# Patient Record
Sex: FEMALE | Race: BLACK OR AFRICAN AMERICAN | NOT HISPANIC OR LATINO | Employment: FULL TIME | URBAN - METROPOLITAN AREA
[De-identification: names, ages, dates, MRNs, and addresses within clinical notes are randomized per-mention and may not be internally consistent; named-entity substitution may affect disease eponyms.]

---

## 2017-10-23 ENCOUNTER — TELEPHONE (OUTPATIENT)
Dept: OBSTETRICS AND GYNECOLOGY | Facility: CLINIC | Age: 27
End: 2017-10-23

## 2017-10-23 NOTE — TELEPHONE ENCOUNTER
DR BRADY PATIENT    HAS NOB APPOINTMENT ON 11/6/17 WITH DR BRADY  HAS QUESTION ABOUT MEDICATIONS    PLEASE CALL HER    WOULD LIKE TO BE CALLED EARLY Tuesday MORNING  [phone]

## 2017-10-24 ENCOUNTER — TELEPHONE (OUTPATIENT)
Dept: OBSTETRICS AND GYNECOLOGY | Facility: CLINIC | Age: 27
End: 2017-10-24

## 2017-10-24 NOTE — TELEPHONE ENCOUNTER
Pt of Dr HAMILTON is needing to have metamucil prescribed to her.  Pharmacy [facility] [phone]

## 2017-10-30 DIAGNOSIS — Z34.91 FIRST TRIMESTER PREGNANCY: Primary | ICD-10-CM

## 2017-11-05 PROBLEM — Z01.419 WELL WOMAN EXAM: Status: ACTIVE | Noted: 2017-11-05

## 2017-11-06 ENCOUNTER — INITIAL PRENATAL (OUTPATIENT)
Dept: OBSTETRICS AND GYNECOLOGY | Facility: CLINIC | Age: 27
End: 2017-11-06

## 2017-11-06 ENCOUNTER — LAB REQUISITION (OUTPATIENT)
Dept: LAB | Facility: HOSPITAL | Age: 27
End: 2017-11-06

## 2017-11-06 VITALS — WEIGHT: 232 LBS | SYSTOLIC BLOOD PRESSURE: 116 MMHG | DIASTOLIC BLOOD PRESSURE: 72 MMHG | BODY MASS INDEX: 39.82 KG/M2

## 2017-11-06 DIAGNOSIS — Z00.00 ROUTINE GENERAL MEDICAL EXAMINATION AT A HEALTH CARE FACILITY: ICD-10-CM

## 2017-11-06 DIAGNOSIS — Z34.01 ENCOUNTER FOR SUPERVISION OF NORMAL FIRST PREGNANCY IN FIRST TRIMESTER: Primary | ICD-10-CM

## 2017-11-06 PROBLEM — K59.00 CONSTIPATION DURING PREGNANCY: Status: ACTIVE | Noted: 2017-11-06

## 2017-11-06 PROBLEM — O99.619 CONSTIPATION DURING PREGNANCY: Status: ACTIVE | Noted: 2017-11-06

## 2017-11-06 PROBLEM — Z34.00 PREGNANCY, SUPERVISION, NORMAL, FIRST: Status: ACTIVE | Noted: 2017-11-06

## 2017-11-06 PROCEDURE — 36415 COLL VENOUS BLD VENIPUNCTURE: CPT

## 2017-11-06 PROCEDURE — 99203 OFFICE O/P NEW LOW 30 MIN: CPT | Performed by: OBSTETRICS & GYNECOLOGY

## 2017-11-06 RX ORDER — RANITIDINE 150 MG/1
150 TABLET ORAL NIGHTLY
COMMUNITY
Start: 2017-09-22 | End: 2020-01-09

## 2017-11-06 NOTE — PROGRESS NOTES
Subjective   Chief Complaint   Patient presents with   • Initial Prenatal Visit       Jud Cerrato is a 26 y.o. year old .  Patient's last menstrual period was 2017 (approximate).  She presents to be seen to initiate prenatal care.     Smoking status: Never Smoker                                                              Smokeless status: Never Used                          The following portions of the patient's history were reviewed and updated as appropriate:vital signs, allergies, current medications, past medical history, past social history, past surgical history and problem list.    Objective   Lab Review   No data reviewed    Imaging   Pelvic ultrasound report    Assessment/Plan   ASSESSMENT  1. 26 y.o. year old  at 6w5d  2. Supervision of low risk pregnancy    PLAN  1. The problem list for pregnancy was initiated today  2. Tests ordered today:  Orders Placed This Encounter   Procedures   • Urine Culture - Urine, Urine, Clean Catch   • HIV-1 / O / 2 Ag / Antibody 4th Generation   • Obstetric Panel   • Urine Drug Screen - Urine, Clean Catch     Please confirm all positive UDS   • Hgb. Frac. Profile     3. Testing for GC / Chlamydia / trichomonas will need to be done at her next prenatal visit  4. Genetic testing reviewed: MSAFP-4 and they will consider the information and make a decision at a later date.  5. Information reviewed: exercise in pregnancy, nutrition in pregnancy, weight gain in pregnancy, work and travel restrictions during pregnancy, list of OTC medications acceptable in pregnancy and call coverage groups    Total time spent today with Jud  was 35 minutes (level 3).  Off this time, 100% was spent face-to-face time coordinating care, answering her questions and counseling regarding pathophysiology of her presenting problem along with plans for any diagnositc work-up and treatment.    Follow up: 6 week(s)       This note was electronically signed.    Jovon Guerra,  MD  November 6, 2017

## 2017-11-07 LAB
HGB A MFR BLD: 97.6 % (ref 94–98)
HGB A2 MFR BLD COLUMN CHROM: 2.4 % (ref 0.7–3.1)
HGB C MFR BLD: 0 %
HGB F MFR BLD: 0 % (ref 0–2)
HGB FRACT BLD-IMP: NORMAL
HGB S BLD QL SOLY: NEGATIVE
HGB S MFR BLD: 0 %

## 2017-11-08 PROBLEM — O99.011 ANEMIA COMPLICATING PREGNANCY IN FIRST TRIMESTER: Status: ACTIVE | Noted: 2017-11-08

## 2017-11-08 LAB
ABO GROUP BLD: (no result)
AMPHETAMINES UR QL SCN: NEGATIVE NG/ML
BACTERIA UR CULT: NORMAL
BACTERIA UR CULT: NORMAL
BARBITURATES UR QL SCN: NEGATIVE NG/ML
BASOPHILS # BLD AUTO: 0 X10E3/UL (ref 0–0.2)
BASOPHILS NFR BLD AUTO: 0 %
BENZODIAZ UR QL SCN: NEGATIVE NG/ML
BLD GP AB SCN SERPL QL: NEGATIVE
BZE UR QL SCN: NEGATIVE NG/ML
CANNABINOIDS UR QL SCN: NEGATIVE NG/ML
CREAT UR-MCNC: 234.4 MG/DL (ref 20–300)
EOSINOPHIL # BLD AUTO: 0.1 X10E3/UL (ref 0–0.4)
EOSINOPHIL NFR BLD AUTO: 0 %
ERYTHROCYTE [DISTWIDTH] IN BLOOD BY AUTOMATED COUNT: 14.6 % (ref 12.3–15.4)
FENTANYL+NORFENTANYL UR QL SCN: NEGATIVE PG/ML
HBV SURFACE AG SERPL QL IA: NEGATIVE
HCT VFR BLD AUTO: 34.5 % (ref 34–46.6)
HGB BLD-MCNC: 11.5 G/DL (ref 11.1–15.9)
HIV 1+2 AB+HIV1 P24 AG SERPL QL IA: NON REACTIVE
IMM GRANULOCYTES # BLD: 0 X10E3/UL (ref 0–0.1)
IMM GRANULOCYTES NFR BLD: 0 %
LYMPHOCYTES # BLD AUTO: 1.9 X10E3/UL (ref 0.7–3.1)
LYMPHOCYTES NFR BLD AUTO: 17 %
Lab: NORMAL
MCH RBC QN AUTO: 28.7 PG (ref 26.6–33)
MCHC RBC AUTO-ENTMCNC: 33.3 G/DL (ref 31.5–35.7)
MCV RBC AUTO: 86 FL (ref 79–97)
MEPERIDINE UR QL: NEGATIVE NG/ML
METHADONE UR QL SCN: NEGATIVE NG/ML
MONOCYTES # BLD AUTO: 1 X10E3/UL (ref 0.1–0.9)
MONOCYTES NFR BLD AUTO: 9 %
NEUTROPHILS # BLD AUTO: 8.4 X10E3/UL (ref 1.4–7)
NEUTROPHILS NFR BLD AUTO: 74 %
OPIATES UR QL SCN: NEGATIVE NG/ML
OXYCODONE+OXYMORPHONE UR QL SCN: NEGATIVE NG/ML
PCP UR QL: NEGATIVE NG/ML
PH UR: 5.9 [PH] (ref 4.5–8.9)
PLATELET # BLD AUTO: 273 X10E3/UL (ref 150–379)
PROPOXYPH UR QL SCN: NEGATIVE NG/ML
RBC # BLD AUTO: 4.01 X10E6/UL (ref 3.77–5.28)
RH BLD: POSITIVE
RPR SER QL: NON REACTIVE
RUBV IGG SERPL IA-ACNC: 1.37 INDEX
SP GR UR: 1.03
TRAMADOL UR QL SCN: NEGATIVE NG/ML
WBC # BLD AUTO: 11.4 X10E3/UL (ref 3.4–10.8)

## 2017-11-08 RX ORDER — FERROUS SULFATE 325(65) MG
325 TABLET ORAL
Qty: 60 TABLET | Refills: 10 | Status: ON HOLD | OUTPATIENT
Start: 2017-11-08 | End: 2018-06-30

## 2017-11-10 ENCOUNTER — TELEPHONE (OUTPATIENT)
Dept: OBSTETRICS AND GYNECOLOGY | Facility: CLINIC | Age: 27
End: 2017-11-10

## 2017-11-10 RX ORDER — ASCORBIC ACID, CALCIUM CITRATE, IRON, VITAMIN D, DL- ALPHA- TOCOPHEROL ACETATE, THIAMINE, RIBOFLAVIN, NIACINAMIDE, PYRIDOXINE HYDROCHLORIDE, FOLIC ACID, IODINE, ZINC, COPPER, DOCUSATE SODIUM, DOCONEXENT AND ICOSAPENT
1 KIT DAILY
Qty: 30 TABLET | Refills: 11 | Status: SHIPPED | OUTPATIENT
Start: 2017-11-10 | End: 2017-11-10 | Stop reason: SDUPTHER

## 2017-11-10 RX ORDER — ASCORBIC ACID, CALCIUM CITRATE, IRON, VITAMIN D, DL- ALPHA- TOCOPHEROL ACETATE, THIAMINE, RIBOFLAVIN, NIACINAMIDE, PYRIDOXINE HYDROCHLORIDE, FOLIC ACID, IODINE, ZINC, COPPER, DOCUSATE SODIUM, DOCONEXENT AND ICOSAPENT
1 KIT DAILY
Qty: 30 TABLET | Refills: 11 | Status: SHIPPED | OUTPATIENT
Start: 2017-11-10 | End: 2017-11-16 | Stop reason: ALTCHOICE

## 2017-11-10 NOTE — TELEPHONE ENCOUNTER
Jud called back today for lab results.  Per Dr. Guerra, she is anemic and needs to be taking iron.  His note indicates that he has already sent that prescription to her pharmacy, and she is to take one tablet daily X 7 days, and if constipation isn't an issue, may increase to 2 tablets daily for the remainder of her pregnancy.  She states that she does have some issues with constipation, and due to that would like to have a prescription for Citra Madai Assure, which she says contains a stool softener.  She was given a sample of this vitamin and has tolerated it well.  I will send in this prescription, and she will take this (if her insurance will cover, will call back if not) and her iron daily.

## 2017-11-16 ENCOUNTER — TELEPHONE (OUTPATIENT)
Dept: OBSTETRICS AND GYNECOLOGY | Facility: CLINIC | Age: 27
End: 2017-11-16

## 2017-11-16 RX ORDER — VITAMIN- MINERAL OMEGA-3 SUPPLEMENT 53.5; 38; 25; 1; 2; 3; 1.8; 5; 25; 300; 12.5; 2; 5; 10; 2 MG/1; MG/1; MG/1; MG/1; MG/1; MG/1; MG/1; MG/1; MG/1; UG/1; UG/1; MG/1; MG/1; MG/1; MG/1
1 CAPSULE, LIQUID FILLED ORAL DAILY
Qty: 30 CAPSULE | Refills: 9 | Status: SHIPPED | OUTPATIENT
Start: 2017-11-16 | End: 2017-12-14

## 2017-11-16 NOTE — TELEPHONE ENCOUNTER
Dr. Guerra patient 8w1d  Insurance will not cover CitraNatal requesting something else that insurance will cover. I will send in a prescription for Concept DHA #30 with 9 refills to her pharmacy [facility]. Patient notified

## 2017-11-16 NOTE — TELEPHONE ENCOUNTER
Pt called stating a RX for PNV was sent to pharmacy but they dont have in stock-states per the pharmacist we need to send a generic RX for PNV they can use that to fill with what they have in stock

## 2017-11-20 ENCOUNTER — TELEPHONE (OUTPATIENT)
Dept: OBSTETRICS AND GYNECOLOGY | Facility: CLINIC | Age: 27
End: 2017-11-20

## 2017-11-20 RX ORDER — VITAMIN- MINERAL OMEGA-3 SUPPLEMENT 53.5; 38; 25; 1; 2; 3; 1.8; 5; 25; 300; 12.5; 2; 5; 10; 2 MG/1; MG/1; MG/1; MG/1; MG/1; MG/1; MG/1; MG/1; MG/1; UG/1; UG/1; MG/1; MG/1; MG/1; MG/1
1 CAPSULE, LIQUID FILLED ORAL DAILY
Qty: 30 CAPSULE | Refills: 12 | Status: SHIPPED | OUTPATIENT
Start: 2017-11-20 | End: 2017-12-14

## 2017-11-27 ENCOUNTER — TELEPHONE (OUTPATIENT)
Dept: OBSTETRICS AND GYNECOLOGY | Facility: CLINIC | Age: 27
End: 2017-11-27

## 2017-11-27 RX ORDER — PNV NO.95/FERROUS FUM/FOLIC AC 28MG-0.8MG
1 TABLET ORAL DAILY
Qty: 30 TABLET | Refills: 12 | Status: SHIPPED | OUTPATIENT
Start: 2017-11-27 | End: 2018-08-13

## 2017-11-27 NOTE — TELEPHONE ENCOUNTER
PT'S PHARMACY SENT OVER A REQUEST FORM FOR A REFILL ON HER PRENATAL VITAMINS. HER INSURANCE WILL NOT APPROVE THE PRENATALS WITH DHA.  PATIENT ONLY HAS ONE PILL LEFT OF HER SAMPLES THAT WERE GIVEN TO HER. SHE NEEDS REFILLS PLEASE.    [facility] ON [address]

## 2017-11-27 NOTE — TELEPHONE ENCOUNTER
Dr Guerra Pt is calling to see if the nurse ever got the fax in order for her to get her prenatal vitamins

## 2017-12-14 ENCOUNTER — ROUTINE PRENATAL (OUTPATIENT)
Dept: OBSTETRICS AND GYNECOLOGY | Facility: CLINIC | Age: 27
End: 2017-12-14

## 2017-12-14 VITALS — SYSTOLIC BLOOD PRESSURE: 112 MMHG | WEIGHT: 258 LBS | BODY MASS INDEX: 44.29 KG/M2 | DIASTOLIC BLOOD PRESSURE: 68 MMHG

## 2017-12-14 DIAGNOSIS — Z34.01 ENCOUNTER FOR SUPERVISION OF NORMAL FIRST PREGNANCY IN FIRST TRIMESTER: Primary | ICD-10-CM

## 2017-12-14 DIAGNOSIS — O99.011 ANEMIA COMPLICATING PREGNANCY IN FIRST TRIMESTER: ICD-10-CM

## 2017-12-14 LAB — 2ND TRIMESTER 4 SCREEN SERPL-IMP: NORMAL

## 2017-12-14 PROCEDURE — 99213 OFFICE O/P EST LOW 20 MIN: CPT | Performed by: OBSTETRICS & GYNECOLOGY

## 2017-12-14 NOTE — PROGRESS NOTES
Chief Complaint   Patient presents with   • Routine Prenatal Visit       HPI: Jud is a  currently at 12w1d who today reports the following:  Nausea - No; Vaginal bleeding -  No; Heartburn - No.    ROS:  GI: Constipation - No; Diarrhea - No    Neuro: Headache - No; Visual change - No      EXAM:  Vitals: See prenatal flowsheet   Abdomen: See prenatal flowsheet   Urine glucose/protein: See prenatal flowsheet   Pelvic: See prenatal flowsheet     Prenatal Labs  Lab Results   Component Value Date    HGB 11.5 2017    RUBELLAABIGG 1.37 2017    HEPBSAG Negative 2017    ABSCRN Negative 2017    AET5QUU6 Non Reactive 2017    URINECX Final report 2017       MDM:  Impression: 1. Supervision of high risk pregnancy  2. Anemia in pregnancy  3. Constipation in pregnancy   Tests done today: 1. PAP  2. GC/Chlamydia testing   Topics discussed: 1. Continue with PNV's  2. Prenatal labs reviewed  3. Today we discussed genetic testing.  They are aware that the MSAFP-4 is a screening test.  A screening test is not a diagnostic test.  This means that a negative test does not guarantee an unaffected fetus and a positive test does not mean the fetus has the condition for which the test is being performed.  If the test returns positive, a diagnostic test should be consider to determine if the fetus in fact has the condition.  After considering the options previously presented, she are not interested in having genetic testing performed.   Tests scheduled today for her next visit:   none   Next visit: See prenatal flowsheet

## 2017-12-20 ENCOUNTER — TELEPHONE (OUTPATIENT)
Dept: OBSTETRICS AND GYNECOLOGY | Facility: CLINIC | Age: 27
End: 2017-12-20

## 2018-01-02 ENCOUNTER — TELEPHONE (OUTPATIENT)
Dept: OBSTETRICS AND GYNECOLOGY | Facility: CLINIC | Age: 28
End: 2018-01-02

## 2018-01-02 NOTE — TELEPHONE ENCOUNTER
Dr Guerra Pt    Pt is about 15 weeks pregnant and is experiencing headaches and pelvic pain.    Pt was scheduled to see Dr. Tello tomorrow.

## 2018-01-03 ENCOUNTER — ROUTINE PRENATAL (OUTPATIENT)
Dept: OBSTETRICS AND GYNECOLOGY | Facility: CLINIC | Age: 28
End: 2018-01-03

## 2018-01-03 VITALS — WEIGHT: 260 LBS | SYSTOLIC BLOOD PRESSURE: 116 MMHG | DIASTOLIC BLOOD PRESSURE: 72 MMHG | BODY MASS INDEX: 44.63 KG/M2

## 2018-01-03 DIAGNOSIS — O99.011 ANEMIA COMPLICATING PREGNANCY IN FIRST TRIMESTER: ICD-10-CM

## 2018-01-03 DIAGNOSIS — Z34.02 ENCOUNTER FOR SUPERVISION OF NORMAL FIRST PREGNANCY IN SECOND TRIMESTER: Primary | ICD-10-CM

## 2018-01-03 PROCEDURE — 99213 OFFICE O/P EST LOW 20 MIN: CPT | Performed by: OBSTETRICS & GYNECOLOGY

## 2018-01-03 NOTE — PROGRESS NOTES
Chief Complaint   Patient presents with   • Routine Prenatal Visit       HPI: Jud is a  currently at 15w0d who today reports the following:  Contractions - No; Leaking - No; Vaginal bleeding -  No; Heartburn - No.    ROS:  GI: Nausea - No ; Constipation - Still a problem.  Using Colace once daily.; Diarrhea - No    Neuro: Headache - No ; Visual change - No      EXAM:  Vitals: See prenatal flowsheet   Abdomen: See prenatal flowsheet   Urine glucose/protein: See prenatal flowsheet   Pelvic: See prenatal flowsheet     Lab Results   Component Value Date    ABO A 2017    RH Positive 2017    ABSCRN Negative 2017       MDM:  Impression: 1. Supervision of high risk pregnancy  2. Anemia in pregnancy  3. Constipation in pregnancy   Tests done today: 1. none   Topics discussed: 1. Continue with PNV's  2. Prenatal labs reviewed  3. Use of over-the-counter MiraLAX as needed  and increase Colace to twice daily   Tests scheduled today for her next visit:   U/S for anatomic screening at [facility]   Next visit: See prenatal flowsheet

## 2018-02-08 ENCOUNTER — HOSPITAL ENCOUNTER (OUTPATIENT)
Dept: WOMENS IMAGING | Facility: HOSPITAL | Age: 28
Discharge: HOME | End: 2018-02-08
Attending: OBSTETRICS & GYNECOLOGY | Admitting: OBSTETRICS & GYNECOLOGY

## 2018-02-08 ENCOUNTER — OFFICE VISIT (OUTPATIENT)
Dept: OBSTETRICS AND GYNECOLOGY | Facility: HOSPITAL | Age: 28
End: 2018-02-08

## 2018-02-08 ENCOUNTER — ROUTINE PRENATAL (OUTPATIENT)
Dept: OBSTETRICS AND GYNECOLOGY | Facility: CLINIC | Age: 28
End: 2018-02-08

## 2018-02-08 VITALS — DIASTOLIC BLOOD PRESSURE: 64 MMHG | WEIGHT: 270 LBS | SYSTOLIC BLOOD PRESSURE: 107 MMHG | BODY MASS INDEX: 46.35 KG/M2

## 2018-02-08 DIAGNOSIS — Z34.02 ENCOUNTER FOR SUPERVISION OF NORMAL FIRST PREGNANCY IN SECOND TRIMESTER: ICD-10-CM

## 2018-02-08 DIAGNOSIS — O99.011 ANEMIA COMPLICATING PREGNANCY IN FIRST TRIMESTER: ICD-10-CM

## 2018-02-08 DIAGNOSIS — Z34.02 ENCOUNTER FOR SUPERVISION OF NORMAL FIRST PREGNANCY IN SECOND TRIMESTER: Primary | ICD-10-CM

## 2018-02-08 DIAGNOSIS — E66.01 MORBID OBESITY WITH BMI OF 45.0-49.9, ADULT (HCC): ICD-10-CM

## 2018-02-08 DIAGNOSIS — IMO0002 FETAL ANOMALY: Primary | ICD-10-CM

## 2018-02-08 DIAGNOSIS — O35.9XX0 TERATOGEN EXPOSURE IN CURRENT PREGNANCY, SINGLE OR UNSPECIFIED FETUS: ICD-10-CM

## 2018-02-08 PROCEDURE — 76811 OB US DETAILED SNGL FETUS: CPT | Performed by: OBSTETRICS & GYNECOLOGY

## 2018-02-08 PROCEDURE — 76811 OB US DETAILED SNGL FETUS: CPT

## 2018-02-08 PROCEDURE — 99213 OFFICE O/P EST LOW 20 MIN: CPT | Performed by: OBSTETRICS & GYNECOLOGY

## 2018-02-08 NOTE — PROGRESS NOTES
Chief Complaint   Patient presents with   • Routine Prenatal Visit       HPI: Jud is a  currently at 20w1d who today reports the following:  Contractions - No; Leaking - No; Vaginal bleeding -  No; Heartburn - No.    ROS:  GI: Nausea - No ; Constipation - No; Diarrhea - No    Neuro: Headache - No ; Visual change - No      EXAM:  Vitals: See prenatal flowsheet   Abdomen: See prenatal flowsheet   Urine glucose/protein: See prenatal flowsheet   Pelvic: See prenatal flowsheet     Lab Results   Component Value Date    ABO A 2017    RH Positive 2017    ABSCRN Negative 2017       MDM:  Impression: 1. Supervision of high risk pregnancy  2. Anemia in pregnancy   Tests done today: 1. U/S for anatomic screening - anatomy was not completely seen today   Topics discussed: 1. Continue with PNV's  2. Prenatal labs reviewed  3. flu vaccine   Tests scheduled today for her next visit:   U/S to complete the anatomic screening at [facility]   Next visit: See prenatal flowsheet

## 2018-02-19 ENCOUNTER — TELEPHONE (OUTPATIENT)
Dept: OBSTETRICS AND GYNECOLOGY | Facility: CLINIC | Age: 28
End: 2018-02-19

## 2018-02-19 NOTE — TELEPHONE ENCOUNTER
Provider Name Mari  Reason for Call Right Sided Pain Pelvic area - since last night  Pharmacy Name [facility]  Call Back Number [phone]

## 2018-02-20 NOTE — TELEPHONE ENCOUNTER
Pt states the pain on her right side started yesterday and the day before, Pt states the pain radiates from her side to her back. When ask Pt has she notice any leaking fluids she states no, when ask if the pain comes and goes Pt states the pain was consistent, Pt also notice she was having headaches as well, but as of today Pt is having no pain and no headaches.

## 2018-02-20 NOTE — TELEPHONE ENCOUNTER
I would let her know that the most common reasons for pain in that location at this gestational age are ligaments pulling and stretching.  We cannot exclude other causes unrelated to her pregnancy such as kidney stones or appendicitis.  If she is doing much better today and having no problems at think it can be just watched.  If she starts to have return of symptoms she probably needs to be seen and examined so we can find out exactly what's going on.

## 2018-03-08 ENCOUNTER — ROUTINE PRENATAL (OUTPATIENT)
Dept: OBSTETRICS AND GYNECOLOGY | Facility: CLINIC | Age: 28
End: 2018-03-08

## 2018-03-08 ENCOUNTER — OFFICE VISIT (OUTPATIENT)
Dept: OBSTETRICS AND GYNECOLOGY | Facility: HOSPITAL | Age: 28
End: 2018-03-08

## 2018-03-08 ENCOUNTER — HOSPITAL ENCOUNTER (OUTPATIENT)
Dept: WOMENS IMAGING | Facility: HOSPITAL | Age: 28
Discharge: HOME | End: 2018-03-08
Attending: OBSTETRICS & GYNECOLOGY | Admitting: OBSTETRICS & GYNECOLOGY

## 2018-03-08 VITALS — SYSTOLIC BLOOD PRESSURE: 109 MMHG | BODY MASS INDEX: 47.2 KG/M2 | WEIGHT: 275 LBS | DIASTOLIC BLOOD PRESSURE: 70 MMHG

## 2018-03-08 DIAGNOSIS — Z34.02 ENCOUNTER FOR SUPERVISION OF NORMAL FIRST PREGNANCY IN SECOND TRIMESTER: Primary | ICD-10-CM

## 2018-03-08 DIAGNOSIS — IMO0002 FETAL ANOMALY: ICD-10-CM

## 2018-03-08 DIAGNOSIS — O35.9XX0 TERATOGEN EXPOSURE IN CURRENT PREGNANCY, SINGLE OR UNSPECIFIED FETUS: ICD-10-CM

## 2018-03-08 DIAGNOSIS — E66.01 MORBID OBESITY WITH BMI OF 45.0-49.9, ADULT (HCC): ICD-10-CM

## 2018-03-08 DIAGNOSIS — O99.011 ANEMIA COMPLICATING PREGNANCY IN FIRST TRIMESTER: ICD-10-CM

## 2018-03-08 DIAGNOSIS — E66.01 MORBID OBESITY WITH BMI OF 45.0-49.9, ADULT (HCC): Primary | ICD-10-CM

## 2018-03-08 PROBLEM — O99.210 OBESITY AFFECTING PREGNANCY: Status: ACTIVE | Noted: 2018-02-08

## 2018-03-08 PROCEDURE — 76816 OB US FOLLOW-UP PER FETUS: CPT

## 2018-03-08 PROCEDURE — 76816 OB US FOLLOW-UP PER FETUS: CPT | Performed by: OBSTETRICS & GYNECOLOGY

## 2018-03-08 PROCEDURE — 99213 OFFICE O/P EST LOW 20 MIN: CPT | Performed by: OBSTETRICS & GYNECOLOGY

## 2018-03-08 NOTE — PROGRESS NOTES
"Pt denies lof, vag bleeding or cramping. States she noticed pelvic pressure this morning when she was having a BM which resolved shortly thereafter. States she wants to talk to Dr Guerra today about other options for helping her achieve regular BMs. States she has BM every 1-3 days, is taking her prescribed meds, eating fiber-rich foods and drinking \"lots of water\".  "

## 2018-03-08 NOTE — PROGRESS NOTES
Documentation of the ultasound findings, images, and interpretations with be available in the patient's Viewpoint report located in the Chart Review Imaging tab in [employer].

## 2018-03-08 NOTE — PROGRESS NOTES
Chief Complaint   Patient presents with   • Routine Prenatal Visit       HPI: Jud is a  currently at 24w1d who today reports the following:  Contractions - No; Leaking - No; Vaginal bleeding -  No; Heartburn - No.    ROS:  GI: Nausea - No ; Constipation - YES; Diarrhea - No    Neuro: Headache - No ; Visual change - No      EXAM:  Vitals: See prenatal flowsheet   Abdomen: See prenatal flowsheet   Urine glucose/protein: See prenatal flowsheet   Pelvic: See prenatal flowsheet     Lab Results   Component Value Date    ABO A 2017    RH Positive 2017    ABSCRN Negative 2017       MDM:  Impression: 1. Supervision of high risk pregnancy  2. Anemia in pregnancy  3. Constipation in pregnancy   Tests done today: 1. U/S to complete the anatomic screening - U/S report is not available for review at this time   Topics discussed: 1. Continue with PNV's  2. Prenatal labs reviewed  3. childbirth classes  4. flu vaccine   Tests scheduled today for her next visit:   GCT  HgB   Next visit: See prenatal flowsheet

## 2018-03-13 ENCOUNTER — TELEPHONE (OUTPATIENT)
Dept: OBSTETRICS AND GYNECOLOGY | Facility: CLINIC | Age: 28
End: 2018-03-13

## 2018-03-13 RX ORDER — POLYETHYLENE GLYCOL 3350 17 G/17G
17 POWDER, FOR SOLUTION ORAL DAILY
Qty: 30 EACH | Refills: 12 | Status: SHIPPED | OUTPATIENT
Start: 2018-03-13 | End: 2019-01-31

## 2018-03-13 NOTE — TELEPHONE ENCOUNTER
Provider Name  JOSE ANTONIO    Reason for Call  Pt would like a Rx for Miralax sent to [facility] on [facility].  Pt is about 24 weeks and has problems with constipation.  She cannot afford to purchase the Miralax as recommended; however her insurance will pay for it if it is prescribed.    Pharmacy Name  [facility]    Call Back Number Florentin Davidson (Pt boyfriend) [phone]

## 2018-03-13 NOTE — TELEPHONE ENCOUNTER
New Medications Ordered This Visit   Medications   • polyethylene glycol (MIRALAX) powder     Sig: Take 17 g by mouth Daily. Dissolve in large glass of water and take daily     Dispense:  30 each     Refill:  12     Please let her know that a Rx has been sent.

## 2018-03-24 ENCOUNTER — HOSPITAL ENCOUNTER (OUTPATIENT)
Facility: HOSPITAL | Age: 28
Setting detail: OBSERVATION
Discharge: HOME | End: 2018-03-24
Attending: OBSTETRICS & GYNECOLOGY | Admitting: OBSTETRICS & GYNECOLOGY

## 2018-03-24 VITALS
DIASTOLIC BLOOD PRESSURE: 71 MMHG | HEART RATE: 77 BPM | TEMPERATURE: 98.3 F | HEIGHT: 64 IN | WEIGHT: 270 LBS | SYSTOLIC BLOOD PRESSURE: 121 MMHG | RESPIRATION RATE: 16 BRPM | BODY MASS INDEX: 46.1 KG/M2

## 2018-03-24 DIAGNOSIS — Z3A.26 PREGNANCY WITH 26 COMPLETED WEEKS GESTATION: ICD-10-CM

## 2018-03-24 DIAGNOSIS — O99.891 BACK PAIN AFFECTING PREGNANCY IN SECOND TRIMESTER: Primary | ICD-10-CM

## 2018-03-24 DIAGNOSIS — M54.9 BACK PAIN AFFECTING PREGNANCY IN SECOND TRIMESTER: Primary | ICD-10-CM

## 2018-03-24 LAB
BACTERIA UR QL AUTO: ABNORMAL /HPF
BILIRUB UR QL STRIP: NEGATIVE
BILIRUB UR QL STRIP: NEGATIVE
CLARITY UR: CLEAR
CLARITY UR: CLEAR
COLOR UR: YELLOW
COLOR UR: YELLOW
GLUCOSE UR STRIP-MCNC: NEGATIVE MG/DL
GLUCOSE UR STRIP-MCNC: NEGATIVE MG/DL
HGB UR QL STRIP.AUTO: NEGATIVE
HGB UR QL STRIP.AUTO: NEGATIVE
HYALINE CASTS UR QL AUTO: ABNORMAL /LPF
KETONES UR QL STRIP: ABNORMAL
KETONES UR QL STRIP: ABNORMAL
LEUKOCYTE ESTERASE UR QL STRIP.AUTO: ABNORMAL
LEUKOCYTE ESTERASE UR QL STRIP.AUTO: NEGATIVE
NITRITE UR QL STRIP: NEGATIVE
NITRITE UR QL STRIP: NEGATIVE
PH UR STRIP.AUTO: 6 [PH] (ref 5–8)
PH UR STRIP.AUTO: 7 [PH] (ref 5–8)
PROT UR QL STRIP: NEGATIVE
PROT UR QL STRIP: NEGATIVE
RBC # UR: ABNORMAL /HPF
REF LAB TEST METHOD: ABNORMAL
SP GR UR STRIP: 1.03 (ref 1–1.03)
SP GR UR STRIP: 1.03 (ref 1–1.03)
SQUAMOUS #/AREA URNS HPF: ABNORMAL /HPF
UROBILINOGEN UR QL STRIP: ABNORMAL
UROBILINOGEN UR QL STRIP: ABNORMAL
WBC UR QL AUTO: ABNORMAL /HPF

## 2018-03-24 PROCEDURE — 59025 FETAL NON-STRESS TEST: CPT

## 2018-03-24 PROCEDURE — 87086 URINE CULTURE/COLONY COUNT: CPT | Performed by: OBSTETRICS & GYNECOLOGY

## 2018-03-24 PROCEDURE — 59025 FETAL NON-STRESS TEST: CPT | Performed by: OBSTETRICS & GYNECOLOGY

## 2018-03-24 PROCEDURE — G0378 HOSPITAL OBSERVATION PER HR: HCPCS

## 2018-03-24 PROCEDURE — 81003 URINALYSIS AUTO W/O SCOPE: CPT | Performed by: OBSTETRICS & GYNECOLOGY

## 2018-03-24 PROCEDURE — 99218 PR INITIAL OBSERVATION CARE/DAY 30 MINUTES: CPT | Performed by: OBSTETRICS & GYNECOLOGY

## 2018-03-24 PROCEDURE — 81001 URINALYSIS AUTO W/SCOPE: CPT | Performed by: OBSTETRICS & GYNECOLOGY

## 2018-03-24 RX ORDER — CYCLOBENZAPRINE HCL 10 MG
10 TABLET ORAL ONCE
Status: COMPLETED | OUTPATIENT
Start: 2018-03-24 | End: 2018-03-24

## 2018-03-24 RX ADMIN — CYCLOBENZAPRINE HYDROCHLORIDE 10 MG: 10 TABLET, FILM COATED ORAL at 13:25

## 2018-03-24 NOTE — NURSING NOTE
1430- called the lab, talked with  urine microscopic not indicated and this rn relayed this information to dr. Singh via telephone. New orders to d/c patient home at this time  1434- d/c instructions given to pt to include second trimester of pregnancy and round ligament pain, when to return to the hospital and when to follow up with ob provider. Pt vu. Pt ambulating to private vehicle accompanied by s/o.

## 2018-03-24 NOTE — H&P
"Jud Cerrato  1990  [id number]  [id number]    CC: back and vaginal pain  HPI:  Patient is 27 y.o. black female   currently at 26w3d  Presents with c/o low back and vaginal pain after a \"near fall\" yesterday.  Pt slipped yesterday at work at ~1730, caught herself and did not fall, but immed afterward had vag pain and low back pain.  Constant, stabbing, rated 7/10.  Pt denies assoc vag bleeding or SROM.  Pt denies contractions.  Heavier vag d/c yesterday only.  BPNC to date except obesity.     PMH:   Current meds PNV, Fe, Zantac, stool softener, ?miralax  Illnesses chron constipation  Surgeries oral surg, colonoscopy, EGD  Allergies NKDA    Past OB History:       Obstetric History       T0      L0     SAB0   TAB0   Ectopic0   Molar0   Multiple0   Live Births0       # Outcome Date GA Lbr Piyush/2nd Weight Sex Delivery Anes PTL Lv   1 Current                        SH: tob neg , EtOH neg, drugs neg  FH: heart dz neg , diabetes pos , cancer neg    General ROS: All systems reviewed and negative except for N (now resolved)      Physical Examination: General appearance - alert, well appearing, and in no distress  Vital signs - /71   Pulse 77   Temp 98.3 °F (36.8 °C)   Resp 16   Ht 162.6 cm (64\")   Wt 122 kg (270 lb)   LMP 2017 (Approximate)   BMI 46.35 kg/m²   HEENT: normocephalic, atraumatic, pharynx clear   Neck - supple, no significant adenopathy  Lymphatics - no palpable lymphadenopathy, no hepatosplenomegaly  Chest - clear to auscultation, no wheezes, rales or rhonchi, symmetric air entry  Heart - normal rate, regular rhythm, normal S1, S2, no murmurs, rubs, clicks or gallops, no JVD  Abdomen - soft, nontender, nondistended, no masses or organomegaly  no rebound tenderness noted, bowel sounds normal  Vaginal Exam: deferred  Extremities - no pedal edema noted  Skin - normal coloration and turgor, no rashes, no suspicious skin lesions noted          Fetal monitoring: indication " vaginal and low back pain , onset 1200 , offset 1244 , baseline 140 , mod BTB variability , multiple accels (10 X 10), no decels, no detectable contractions, interpretation reactive NST    Radiology     Assessment 1)IUP 26 3/7 weeks     2)vaginal pain- like due to round lig spasms   3)low back pain- prob mm strain   4)obesity    Plan 1)observe    2)cath UA (ccUA contam)     3)home (after results of UA)      4)keep next sched appt    Jarod Singh MD  3/24/2018  1:16 PM

## 2018-03-26 LAB
BACTERIA SPEC AEROBE CULT: NORMAL
BACTERIA SPEC AEROBE CULT: NORMAL

## 2018-04-11 ENCOUNTER — ROUTINE PRENATAL (OUTPATIENT)
Dept: OBSTETRICS AND GYNECOLOGY | Facility: CLINIC | Age: 28
End: 2018-04-11

## 2018-04-11 ENCOUNTER — LAB REQUISITION (OUTPATIENT)
Dept: LAB | Facility: HOSPITAL | Age: 28
End: 2018-04-11

## 2018-04-11 VITALS — WEIGHT: 272 LBS | BODY MASS INDEX: 46.69 KG/M2 | SYSTOLIC BLOOD PRESSURE: 110 MMHG | DIASTOLIC BLOOD PRESSURE: 68 MMHG

## 2018-04-11 DIAGNOSIS — Z00.00 ROUTINE GENERAL MEDICAL EXAMINATION AT A HEALTH CARE FACILITY: ICD-10-CM

## 2018-04-11 DIAGNOSIS — O99.011 ANEMIA COMPLICATING PREGNANCY IN FIRST TRIMESTER: ICD-10-CM

## 2018-04-11 DIAGNOSIS — K21.9 LPRD (LARYNGOPHARYNGEAL REFLUX DISEASE): Primary | ICD-10-CM

## 2018-04-11 DIAGNOSIS — Z34.02 ENCOUNTER FOR SUPERVISION OF NORMAL FIRST PREGNANCY IN SECOND TRIMESTER: ICD-10-CM

## 2018-04-11 PROBLEM — O99.891 BACK PAIN AFFECTING PREGNANCY IN SECOND TRIMESTER: Status: RESOLVED | Noted: 2018-03-24 | Resolved: 2018-04-11

## 2018-04-11 PROBLEM — O99.619 CONSTIPATION DURING PREGNANCY: Status: RESOLVED | Noted: 2017-11-06 | Resolved: 2018-04-11

## 2018-04-11 PROBLEM — K59.00 CONSTIPATION DURING PREGNANCY: Status: RESOLVED | Noted: 2017-11-06 | Resolved: 2018-04-11

## 2018-04-11 PROBLEM — M54.9 BACK PAIN AFFECTING PREGNANCY IN SECOND TRIMESTER: Status: RESOLVED | Noted: 2018-03-24 | Resolved: 2018-04-11

## 2018-04-11 LAB
GLUCOSE 1H P 50 G GLC PO SERPL-MCNC: 110 MG/DL (ref 65–139)
GLUCOSE BLDC GLUCOMTR-MCNC: 114 MG/DL
HCT VFR BLD AUTO: 36.7 % (ref 34.5–44)
HGB BLD-MCNC: 11.8 G/DL (ref 11.5–15.5)

## 2018-04-11 PROCEDURE — 82962 GLUCOSE BLOOD TEST: CPT | Performed by: OBSTETRICS & GYNECOLOGY

## 2018-04-11 PROCEDURE — 99213 OFFICE O/P EST LOW 20 MIN: CPT | Performed by: OBSTETRICS & GYNECOLOGY

## 2018-04-11 PROCEDURE — 36415 COLL VENOUS BLD VENIPUNCTURE: CPT | Performed by: OBSTETRICS & GYNECOLOGY

## 2018-04-11 NOTE — PROGRESS NOTES
Chief Complaint   Patient presents with   • Routine Prenatal Visit       HPI: Jud is a  currently at 29w0d who today reports the following:  Contractions - No; Leaking - No; Vaginal bleeding -  No; Heartburn - No but have some SOB at times.    ROS:  GI: Nausea - No ; Constipation - No; Diarrhea - No    Neuro: Headache - No ; Visual change - No      EXAM:  Vitals: See prenatal flowsheet   Abdomen: See prenatal flowsheet   Urine glucose/protein: See prenatal flowsheet   Pelvic: See prenatal flowsheet     Lab Results   Component Value Date    ABO A 2017    RH Positive 2017    ABSCRN Negative 2017       MDM:  Impression: 1. Supervision of high risk pregnancy  2. Anemia in pregnancy  3. LPRD   Tests done today: 1. GCT  2. HgB   Topics discussed: 1. Continue with PNV's  2. Prenatal labs reviewed  3. breast feeding - needs shells for inverted nipples  4. OTC medical options for her LPRD   Tests scheduled today for her next visit:   none   Next visit: See prenatal flowsheet

## 2018-04-25 ENCOUNTER — ROUTINE PRENATAL (OUTPATIENT)
Dept: OBSTETRICS AND GYNECOLOGY | Facility: CLINIC | Age: 28
End: 2018-04-25

## 2018-04-25 VITALS — SYSTOLIC BLOOD PRESSURE: 112 MMHG | WEIGHT: 280 LBS | DIASTOLIC BLOOD PRESSURE: 68 MMHG | BODY MASS INDEX: 48.06 KG/M2

## 2018-04-25 DIAGNOSIS — K21.9 LPRD (LARYNGOPHARYNGEAL REFLUX DISEASE): ICD-10-CM

## 2018-04-25 DIAGNOSIS — Z34.03 ENCOUNTER FOR SUPERVISION OF NORMAL FIRST PREGNANCY IN THIRD TRIMESTER: Primary | ICD-10-CM

## 2018-04-25 PROBLEM — O99.213 OBESITY AFFECTING PREGNANCY IN THIRD TRIMESTER: Status: ACTIVE | Noted: 2018-02-08

## 2018-04-25 PROBLEM — O99.013 ANEMIA IN PREGNANCY, THIRD TRIMESTER: Status: ACTIVE | Noted: 2018-04-25

## 2018-04-25 PROCEDURE — 99213 OFFICE O/P EST LOW 20 MIN: CPT | Performed by: OBSTETRICS & GYNECOLOGY

## 2018-04-25 RX ORDER — OMEPRAZOLE 40 MG/1
40 CAPSULE, DELAYED RELEASE ORAL DAILY
Qty: 30 CAPSULE | Refills: 4 | Status: SHIPPED | OUTPATIENT
Start: 2018-04-25 | End: 2019-01-31

## 2018-04-25 NOTE — PROGRESS NOTES
Chief Complaint   Patient presents with   • Routine Prenatal Visit       HPI: Jud is a  currently at 31w0d who today reports the following:  Contractions - No; Leaking - No; Vaginal bleeding -  No; Swelling of extremities - No.    ROS:  GI: Nausea - No; Constipation - No; Diarrhea - No    Neuro: Headache - No; Visual change - No      EXAM:  Vitals: See prenatal flowsheet   Abdomen: See prenatal flowsheet   Urine glucose/protein: See prenatal flowsheet   Pelvic: See prenatal flowsheet   MDM:   Impression: 1. Supervision of low risk pregnancy  2. LPRD  3. Anemia in pregnancy   Tests done today: 1. none   Topics discussed: 1. Continue with PNV's  2. Prenatal labs reviewed  3. birth plan   Tests scheduled today for her next visit:   none   Next visit: See prenatal flowsheet     New Medications Ordered This Visit   Medications   • omeprazole (PRILOSEC) 40 MG capsule     Sig: Take 1 capsule by mouth Daily.     Dispense:  30 capsule     Refill:  4

## 2018-05-09 ENCOUNTER — ROUTINE PRENATAL (OUTPATIENT)
Dept: OBSTETRICS AND GYNECOLOGY | Facility: CLINIC | Age: 28
End: 2018-05-09

## 2018-05-09 VITALS — DIASTOLIC BLOOD PRESSURE: 72 MMHG | BODY MASS INDEX: 49.61 KG/M2 | WEIGHT: 289 LBS | SYSTOLIC BLOOD PRESSURE: 110 MMHG

## 2018-05-09 DIAGNOSIS — Z34.03 ENCOUNTER FOR SUPERVISION OF NORMAL FIRST PREGNANCY IN THIRD TRIMESTER: ICD-10-CM

## 2018-05-09 DIAGNOSIS — O99.013 ANEMIA IN PREGNANCY, THIRD TRIMESTER: ICD-10-CM

## 2018-05-09 DIAGNOSIS — O99.213 OBESITY AFFECTING PREGNANCY IN THIRD TRIMESTER: ICD-10-CM

## 2018-05-09 DIAGNOSIS — K21.9 LPRD (LARYNGOPHARYNGEAL REFLUX DISEASE): ICD-10-CM

## 2018-05-09 PROCEDURE — 99213 OFFICE O/P EST LOW 20 MIN: CPT | Performed by: OBSTETRICS & GYNECOLOGY

## 2018-05-09 NOTE — PROGRESS NOTES
Chief Complaint   Patient presents with   • Routine Prenatal Visit       HPI: Jud is a  currently at 33w0d who today reports the following:  Contractions - YES - but less than 4/hour AND no associated change in vaginal discharge; Leaking - No; Vaginal bleeding -  No; Swelling of extremities - No.    ROS:  GI: Nausea - No; Constipation - No; Diarrhea - No    Neuro: Headache - No; Visual change - No      EXAM:  Vitals: See prenatal flowsheet   Abdomen: See prenatal flowsheet   Urine glucose/protein: See prenatal flowsheet   Pelvic: See prenatal flowsheet   MDM:   Impression: 1. Supervision of high risk pregnancy  2. Anemia in pregnancy  3. LPRD - much better with AM PPI and qHS H2 blockers   Tests done today: 1. none   Topics discussed: 1. Continue with PNV's  2. Prenatal labs reviewed  3. none - she had no major complaints,questions or concerns   Tests scheduled today for her next visit:   none

## 2018-05-25 ENCOUNTER — ROUTINE PRENATAL (OUTPATIENT)
Dept: OBSTETRICS AND GYNECOLOGY | Facility: CLINIC | Age: 28
End: 2018-05-25

## 2018-05-25 VITALS — BODY MASS INDEX: 49.26 KG/M2 | WEIGHT: 287 LBS | SYSTOLIC BLOOD PRESSURE: 122 MMHG | DIASTOLIC BLOOD PRESSURE: 68 MMHG

## 2018-05-25 DIAGNOSIS — Z34.03 ENCOUNTER FOR SUPERVISION OF NORMAL FIRST PREGNANCY IN THIRD TRIMESTER: Primary | ICD-10-CM

## 2018-05-25 PROBLEM — K21.9 LPRD (LARYNGOPHARYNGEAL REFLUX DISEASE): Status: RESOLVED | Noted: 2018-04-11 | Resolved: 2018-05-25

## 2018-05-25 LAB — GP B STREP RRNA SPEC QL PROBE: POSITIVE

## 2018-05-25 PROCEDURE — 99213 OFFICE O/P EST LOW 20 MIN: CPT | Performed by: OBSTETRICS & GYNECOLOGY

## 2018-05-25 NOTE — PROGRESS NOTES
Chief Complaint   Patient presents with   • Routine Prenatal Visit     ob visit with GBS       HPI: Jud is a  currently at 35w2d who today reports the following:  Contractions - No; Leaking - No; Vaginal bleeding -  No; Swelling of extremities - No.    ROS:  GI: Nausea - No; Constipation - No; Diarrhea - No    Neuro: Headache - No; Visual change - No      EXAM:  Vitals: See prenatal flowsheet   Abdomen: See prenatal flowsheet   Urine glucose/protein: See prenatal flowsheet   Pelvic: See prenatal flowsheet   MDM:   Impression: 1. Supervision of high risk pregnancy  2. Anemia in pregnancy  3. LPRD - better with meds   Tests done today: 1. GBS testing   Topics discussed: 1. Continue with PNV's  2. Prenatal labs reviewed  3. labor signs and symptoms   Tests scheduled today for her next visit:   none

## 2018-05-29 PROBLEM — O99.820 GROUP B STREPTOCOCCAL CARRIAGE COMPLICATING PREGNANCY: Status: ACTIVE | Noted: 2018-05-29

## 2018-05-31 ENCOUNTER — ROUTINE PRENATAL (OUTPATIENT)
Dept: OBSTETRICS AND GYNECOLOGY | Facility: CLINIC | Age: 28
End: 2018-05-31

## 2018-05-31 VITALS — WEIGHT: 288 LBS | DIASTOLIC BLOOD PRESSURE: 72 MMHG | SYSTOLIC BLOOD PRESSURE: 120 MMHG | BODY MASS INDEX: 49.44 KG/M2

## 2018-05-31 DIAGNOSIS — Z34.03 ENCOUNTER FOR SUPERVISION OF NORMAL FIRST PREGNANCY IN THIRD TRIMESTER: ICD-10-CM

## 2018-05-31 DIAGNOSIS — O99.213 OBESITY AFFECTING PREGNANCY IN THIRD TRIMESTER: ICD-10-CM

## 2018-05-31 DIAGNOSIS — O99.820 GROUP B STREPTOCOCCAL CARRIAGE COMPLICATING PREGNANCY: ICD-10-CM

## 2018-05-31 DIAGNOSIS — O99.013 ANEMIA IN PREGNANCY, THIRD TRIMESTER: ICD-10-CM

## 2018-05-31 PROCEDURE — 99213 OFFICE O/P EST LOW 20 MIN: CPT | Performed by: OBSTETRICS & GYNECOLOGY

## 2018-05-31 NOTE — PROGRESS NOTES
Chief Complaint   Patient presents with   • Routine Prenatal Visit       HPI: Jud is a  currently at 36w1d who today reports the following:  Contractions - No; Leaking - No; Vaginal bleeding -  No; Swelling of extremities - No.    ROS:  GI: Nausea - No; Constipation - No; Diarrhea - No    Neuro: Headache - No; Visual change - No      EXAM:  Vitals: See prenatal flowsheet   Abdomen: See prenatal flowsheet   Urine glucose/protein: See prenatal flowsheet   Pelvic: See prenatal flowsheet   MDM:   Impression: 1. Supervision of high risk pregnancy  2. GBS (+)  3. Anemia in pregnancy   Tests done today: 1. none   Topics discussed: 1. Continue with PNV's  2. Prenatal labs reviewed  3. It was explained to Jud that her group B tests returned positive.  This is not uncommon.  As many as 20% of people will test positive for group B strep.  Recommendations would be to treat her with intravenous antibiotics during labor.  There are no current recommendations to treat in advance of labor to try to eradicate the colonization.  The chance of a  infection if she receives appropriate antibiotics is remote but not zero.  All of her questions related to this test were answered fully.   Tests scheduled today for her next visit:   none

## 2018-06-06 ENCOUNTER — ROUTINE PRENATAL (OUTPATIENT)
Dept: OBSTETRICS AND GYNECOLOGY | Facility: CLINIC | Age: 28
End: 2018-06-06

## 2018-06-06 VITALS — BODY MASS INDEX: 50.12 KG/M2 | WEIGHT: 292 LBS | SYSTOLIC BLOOD PRESSURE: 122 MMHG | DIASTOLIC BLOOD PRESSURE: 70 MMHG

## 2018-06-06 DIAGNOSIS — Z34.03 ENCOUNTER FOR SUPERVISION OF NORMAL FIRST PREGNANCY IN THIRD TRIMESTER: ICD-10-CM

## 2018-06-06 DIAGNOSIS — O99.013 ANEMIA IN PREGNANCY, THIRD TRIMESTER: ICD-10-CM

## 2018-06-06 DIAGNOSIS — O99.213 OBESITY AFFECTING PREGNANCY IN THIRD TRIMESTER: Primary | ICD-10-CM

## 2018-06-06 PROCEDURE — 99213 OFFICE O/P EST LOW 20 MIN: CPT | Performed by: OBSTETRICS & GYNECOLOGY

## 2018-06-06 NOTE — PROGRESS NOTES
Chief Complaint   Patient presents with   • Routine Prenatal Visit       HPI: Jud is a  currently at 37w0d who today reports the following: tired - works at   Contractions - BH's ; Leaking - No; Vaginal bleeding -  No; Swelling of extremities - YES.    ROS:  GI: Nausea - No; Constipation - improved as compared to the prior visit; Diarrhea - No    Neuro: Headache - No; Visual change - No      EXAM:  Vitals: See prenatal flowsheet   Abdomen: See prenatal flowsheet   Urine glucose/protein: See prenatal flowsheet   Pelvic: See prenatal flowsheet   MDM:   Impression: 1. Supervision of high risk pregnancy  2. Anemia in pregnancy  3. Excess weight gain in pregnancy   Tests done today: 1. none   Topics discussed: 1. Continue with PNV's  2. Prenatal labs reviewed  3. labor signs and symptoms 1-5-1 rule    Tests scheduled today for her next visit:   none

## 2018-06-13 ENCOUNTER — ROUTINE PRENATAL (OUTPATIENT)
Dept: OBSTETRICS AND GYNECOLOGY | Facility: CLINIC | Age: 28
End: 2018-06-13

## 2018-06-13 ENCOUNTER — TELEPHONE (OUTPATIENT)
Dept: OBSTETRICS AND GYNECOLOGY | Facility: CLINIC | Age: 28
End: 2018-06-13

## 2018-06-13 VITALS — BODY MASS INDEX: 50.46 KG/M2 | WEIGHT: 293 LBS | DIASTOLIC BLOOD PRESSURE: 82 MMHG | SYSTOLIC BLOOD PRESSURE: 128 MMHG

## 2018-06-13 DIAGNOSIS — Z34.03 ENCOUNTER FOR SUPERVISION OF NORMAL FIRST PREGNANCY IN THIRD TRIMESTER: ICD-10-CM

## 2018-06-13 DIAGNOSIS — O99.820 GROUP B STREPTOCOCCAL CARRIAGE COMPLICATING PREGNANCY: ICD-10-CM

## 2018-06-13 DIAGNOSIS — O99.213 OBESITY AFFECTING PREGNANCY IN THIRD TRIMESTER: ICD-10-CM

## 2018-06-13 DIAGNOSIS — O99.013 ANEMIA IN PREGNANCY, THIRD TRIMESTER: ICD-10-CM

## 2018-06-13 PROCEDURE — 99213 OFFICE O/P EST LOW 20 MIN: CPT | Performed by: OBSTETRICS & GYNECOLOGY

## 2018-06-13 NOTE — PROGRESS NOTES
Chief Complaint   Patient presents with   • Pregnancy Problem       HPI: Jud is a  currently at 38w0d who today reports the following:  Feeling more lightheaded and dizzy.  Has been under more stress.    ROS:  GI: Nausea - No; Constipation - No; Diarrhea - No    Neuro: Headache - No; Visual change - No      EXAM:  Vitals: See prenatal flowsheet   Abdomen: See prenatal flowsheet   Urine glucose/protein: See prenatal flowsheet   Pelvic: See prenatal flowsheet   MDM:   Impression: 1. Supervision of high risk pregnancy  2. Constitutional symptoms without hypertension.  Suspect this represents either anxiety or hypoglycemia related to carbohydrate excess  3. Anemia in pregnancy   Tests done today: 1. none   Topics discussed: 1. Continue with PNV's  2. Prenatal labs reviewed  3. Increase fluid intake; decreased carbohydrate intake and increased protein along with fat   4. Stress reduction techniques including yoga, massage and after exercise encouraged    Tests scheduled today for her next visit:   none

## 2018-06-13 NOTE — TELEPHONE ENCOUNTER
Dr Guerra Patient    Patient called back, feeling dizzy and numbness in arms    Call her at work  [phone]

## 2018-06-13 NOTE — TELEPHONE ENCOUNTER
Provider Name  Dr Guerra    Reason for Call  38 weeks pregnant, having numbness in both arms, feeling dizzy and faint, had headache earlier today    Pharmacy Name  [facility] [address]    Call Back Number  [phone]

## 2018-06-15 ENCOUNTER — ROUTINE PRENATAL (OUTPATIENT)
Dept: OBSTETRICS AND GYNECOLOGY | Facility: CLINIC | Age: 28
End: 2018-06-15

## 2018-06-15 VITALS — WEIGHT: 293 LBS | SYSTOLIC BLOOD PRESSURE: 126 MMHG | BODY MASS INDEX: 50.64 KG/M2 | DIASTOLIC BLOOD PRESSURE: 82 MMHG

## 2018-06-15 DIAGNOSIS — O99.820 GROUP B STREPTOCOCCAL CARRIAGE COMPLICATING PREGNANCY: ICD-10-CM

## 2018-06-15 DIAGNOSIS — Z34.03 ENCOUNTER FOR SUPERVISION OF NORMAL FIRST PREGNANCY IN THIRD TRIMESTER: ICD-10-CM

## 2018-06-15 DIAGNOSIS — O99.213 OBESITY AFFECTING PREGNANCY IN THIRD TRIMESTER: ICD-10-CM

## 2018-06-15 DIAGNOSIS — O99.013 ANEMIA IN PREGNANCY, THIRD TRIMESTER: ICD-10-CM

## 2018-06-15 PROCEDURE — 99213 OFFICE O/P EST LOW 20 MIN: CPT | Performed by: OBSTETRICS & GYNECOLOGY

## 2018-06-15 NOTE — PROGRESS NOTES
Chief Complaint   Patient presents with   • Routine Prenatal Visit       HPI: Jud is a  currently at 38w2d who today reports the following:  Contractions - No; Leaking - No; Vaginal bleeding -  No; Swelling of extremities - No.    ROS:  GI: Nausea - No; Constipation - No; Diarrhea - No    Neuro: Headache - No; Visual change - No      EXAM:  Vitals: See prenatal flowsheet   Abdomen: See prenatal flowsheet   Urine glucose/protein: See prenatal flowsheet   Pelvic: See prenatal flowsheet   MDM:   Impression: 1. Supervision of high risk pregnancy  2. GBS (+)  3. Anemia in pregnancy   Tests done today: 1. none   Topics discussed: 1. Continue with PNV's  2. Prenatal labs reviewed  3. none - she had no major complaints,questions or concerns  4. post-dates management   Tests scheduled today for her next visit:   none     I spoke with Jud about management of postdates pregnancy with an unfavorable cervix.  If the cervix is unfavorable, the  rate with IOL may be > 2x the baseline  rate.  I explained to Jud that as any pregnancy extends beyond the due date, the risks of fetal demise gradually rise.  Beyond 41 1/2 weeks gestation, the rates of IUFD increases further.  The risks of extending pregnancy beyond the due date have to be balanced against the risk of increasing the rates of  with induction of labor (IOL).   She will be seen twice weekly after her EDC.  At each visit, an assessment of fetal health and well being will be performed (i.e. NST, BPP, TYRESE).  So long as the assessment of fetal health is reassuring and there is no maternal indication for an IOL, it was my recommendation that unless there is evidence of excess maternal or fetal risk, we defer IOL until 41 1/2 weeks.  I told Jud that beyond 39 weeks, an elective IOL is always an option.  However, I cannot alter the increased rate of  if her cervix is unfavorable.  Additional, I discussed with Jud fetal movement  counting. I explained to Jud that if she is noticing diminished quantity of fetal movement, she should eat or drink something and lay down on her side for 30 minutes.  So long as there are 3 movements in the 30 minute window, fetal movement is normal.  She should do this twice daily if fetal movement is perceived to be diminished.  If ever she gets less than 3 movements in a 30 minute window, she is to call the office immediately for further fetal testing.

## 2018-06-21 ENCOUNTER — ROUTINE PRENATAL (OUTPATIENT)
Dept: OBSTETRICS AND GYNECOLOGY | Facility: CLINIC | Age: 28
End: 2018-06-21

## 2018-06-21 ENCOUNTER — HOSPITAL ENCOUNTER (OUTPATIENT)
Facility: HOSPITAL | Age: 28
Discharge: HOME | End: 2018-06-21
Attending: OBSTETRICS & GYNECOLOGY | Admitting: OBSTETRICS & GYNECOLOGY

## 2018-06-21 VITALS
RESPIRATION RATE: 20 BRPM | SYSTOLIC BLOOD PRESSURE: 135 MMHG | DIASTOLIC BLOOD PRESSURE: 79 MMHG | HEART RATE: 69 BPM | HEIGHT: 64 IN | BODY MASS INDEX: 50.02 KG/M2 | WEIGHT: 293 LBS | TEMPERATURE: 98.2 F

## 2018-06-21 VITALS — DIASTOLIC BLOOD PRESSURE: 78 MMHG | BODY MASS INDEX: 51.12 KG/M2 | WEIGHT: 293 LBS | SYSTOLIC BLOOD PRESSURE: 118 MMHG

## 2018-06-21 DIAGNOSIS — O99.820 GROUP B STREPTOCOCCAL CARRIAGE COMPLICATING PREGNANCY: Primary | ICD-10-CM

## 2018-06-21 DIAGNOSIS — O36.8131 DECREASED FETAL MOVEMENTS IN THIRD TRIMESTER, FETUS 1 OF MULTIPLE GESTATION: ICD-10-CM

## 2018-06-21 PROCEDURE — G0463 HOSPITAL OUTPT CLINIC VISIT: HCPCS

## 2018-06-21 PROCEDURE — 99213 OFFICE O/P EST LOW 20 MIN: CPT | Performed by: OBSTETRICS & GYNECOLOGY

## 2018-06-21 PROCEDURE — 59025 FETAL NON-STRESS TEST: CPT

## 2018-06-21 PROCEDURE — 59025 FETAL NON-STRESS TEST: CPT | Performed by: OBSTETRICS & GYNECOLOGY

## 2018-06-21 NOTE — H&P
[facility]  Obstetric History and Physical    Chief Complaint   Patient presents with   • Decreased Fetal Movement     sent from office       Subjective     Patient is a 27 y.o. female  currently at 39w1d, who presents with complaints of decreased fetal movement.  Since presentation, the patient reports normal fetal movement.  Fetal kick counts were discussed.  Signs and symptoms of labor were reviewed.    Her prenatal care is benign to date.  Risk factors include morbid obesity and iron deficiency anemia. Her previous obstetric/gynecological history is noncontributory.    The following portions of the patients history were reviewed and updated as appropriate: current medications, allergies, past medical history, past surgical history, past family history, past social history and problem list .       Prenatal Information:   Maternal Prenatal Labs  Blood Type No results found for: ABO   Rh Status No results found for: RH   Antibody Screen No results found for: ABSCRN   Gonnorhea No results found for: GCCX   Chlamydia No results found for: CLAMYDCU   RPR No results found for: RPR   Syphilis Antibody No results found for: SYPHILIS   Rubella No results found for: RUBELLAIGGIN   Hepatitis B Surface Antigen No results found for: HEPBSAG   HIV-1 Antibody No results found for: LABHIV1   Hepatitis C Antibody No results found for: HEPCAB   Rapid Urin Drug Screen No results found for: AMPMETHU, BARBITSCNUR, LABBENZSCN, LABMETHSCN, LABOPIASCN, THCURSCR, COCAINEUR, AMPHETSCREEN, PROPOXSCN, BUPRENORSCNU, METAMPSCNUR, OXYCODONESCN, TRICYCLICSCN   Group B Strep Culture No results found for: GBSANTIGEN           External Prenatal Results     Pregnancy Outside Results - these were transcribed from office records.  See scanned records for details.     Test Value Date Time    Hgb 11.8 g/dL 18 1048    Hct 36.7 % 18 1048    ABO A  17 1702    Rh Positive  17 1702    Antibody Screen Negative  17 170     Glucose Fasting GTT       Glucose Tolerance Test 1 hour       Glucose Tolerance Test 3 hour       Gonorrhea (discrete) negative  17     Chlamydia (discrete) negative  17     RPR Non Reactive  17 1702    VDRL       Syphillis Antibody       Rubella 1.37 index 17 1702    HBsAg Negative  17 1702    Herpes Simplex Virus PCR       Herpes Simplex VIrus Culture       HIV Non Reactive  17 1702    Hep C RNA Quant PCR       Hep C Antibody       AFP       Group B Strep positive  18     GBS Susceptibility to Clindamycin       GBS Susceptibility to Eythromycin       Fetal Fibronectin       Genetic Testing, Maternal Blood             Drug Screening     Test Value Date Time    Urine Drug Screen       Amphetamine Screen Negative ng/mL 17 1702    Barbiturate Screen Negative ng/mL 17 1702    Benzodiazepine Screen Negative ng/mL 17 1702    Methadone Screen Negative ng/mL 17 1702    Phencyclidine Screen Negative ng/mL 17 1702    Opiates Screen       THC Screen       Cocaine Screen       Propoxyphene Screen Negative ng/mL 17 1702    Buprenorphine Screen       Methamphetamine Screen       Oxycodone Screen       Tryicyclic Antidepressants Screen                     Past OB History:       Obstetric History       T0      L0     SAB0   TAB0   Ectopic0   Molar0   Multiple0   Live Births0       # Outcome Date GA Lbr Piyush/2nd Weight Sex Delivery Anes PTL Lv   1 Current                   Past Medical History: Past Medical History:   Diagnosis Date   • Anxiety    • Chronic constipation    • H/O: depression    • Kidney stone    • PTSD (post-traumatic stress disorder)       Past Surgical History Past Surgical History:   Procedure Laterality Date   • COLONOSCOPY     • ENDOSCOPY     • WISDOM TOOTH EXTRACTION  2015      Family History: Family History   Problem Relation Age of Onset   • No Known Problems Father    • Ovarian cancer Mother    •  Diabetes Mother    • No Known Problems Brother    • No Known Problems Sister    • No Known Problems Son    • No Known Problems Daughter    • No Known Problems Paternal Grandfather    • No Known Problems Paternal Grandmother    • Hypertension Maternal Grandmother    • Diabetes Maternal Grandfather       Social History:  reports that she quit smoking about 15 months ago. Her smoking use included Cigarettes. She has never used smokeless tobacco.   reports that she does not drink alcohol.   reports that she uses drugs, including Marijuana.        General ROS: Pertinent items are noted in HPI, all other systems reviewed and negative    Objective       Vital Signs Range for the last 24 hours  Temperature: Temp:  [98.2 °F (36.8 °C)] 98.2 °F (36.8 °C)   Temp Source: Temp src: Oral   BP: BP: (118-135)/(78-79) 135/79   Pulse: Heart Rate:  [69] 69   Respirations: Resp:  [20] 20   SPO2:     O2 Amount (l/min):     O2 Devices     Weight: Weight:  [135 kg (297 lb)-135 kg (297 lb 12.8 oz)] 135 kg (297 lb)     Physical Examination: General appearance - alert, well appearing, and in no distress  Mental status - alert, oriented to person, place, and time  Eyes - pupils equal and reactive, extraocular eye movements intact  Mouth - mucous membranes moist, pharynx normal without lesions  Neck - supple, no significant adenopathy, thyroid exam: thyroid is normal in size without nodules or tenderness  Chest - clear to auscultation, no wheezes, rales or rhonchi, symmetric air entry  Heart - normal rate, regular rhythm, normal S1, S2, no murmurs, rubs, clicks or gallops  Abdomen - soft, nontender, nondistended.  Fundal height is term size.  Uterine fundus is not tender.  Neurological - DTR's normal and symmetric  Extremities - peripheral pulses normal, no pedal edema, no clubbing or cyanosis, pedal edema 1 +    Presentation: Cephalic       Cervix: 1 cm/50%/-3 station     Fetal Heart Rate Assessment   NST: Reactive tracing without  "decelerations.  Baseline 125-135 bpm.  Accelerations present.  Absent decelerations.  Indication: Decreased fetal movement.  Start Time: 1525  End Time: 1642         Laboratory Results:   No results found for: ALKPHOS, ALT, AST, CREATININE, BILITOT, LDH, URICACID    No results found for: WBC, RBC, HGB, HCT, MCV, MCH, MCHC, RDW, RDWSD, MPV, PLT, NEUTRORELPCT, LYMPHORELPCT, MONORELPCT, EOSRELPCT, BASORELPCT, AUTOIGPER, NEUTROABS, LYMPHSABS, MONOSABS, EOSABS, BASOSABS, AUTOIGNUM, NRBC          Brief Urine Lab Results  (Last result in the past 365 days)      Color   Clarity   Blood   Leuk Est   Nitrite   Protein   CREAT   Urine HCG        03/24/18 1333 Yellow Clear Negative Negative Negative Negative                 Radiology Review: No new studies  Other Studies: None    Assessment/Plan     Active Problems:    * No active hospital problems. *        Assessment:  1.  Complaints of decreased fetal movement.  Fetal well-being confirmed.  2.  Morbid obesity.  3.  History of iron deficiency anemia of pregnancy.  Plan:  1.  Discharge home.  2.  Patient counseled regarding fetal kick counts.  3.  Signs/symptoms of labor reviewed.  4.  Keep regular follow-up appointment with Dr. Guerra on June 29.          Miah Cortes \"Manlius\" RICCARDO Le MD  6/21/2018  5:00 PM    "

## 2018-06-21 NOTE — NURSING NOTE
Patient discharged home undelivered. Informed patient to keep follow up appointment or induction date. Informed patient to return to hospital or clinic with contractions that get stronger or closer together, decreased fetal movement, leaking of fluid, or vaginal bleeding. Patient verbalized understanding. Patient ambulated to private vehicle accompanied by family.

## 2018-06-27 ENCOUNTER — HOSPITAL ENCOUNTER (INPATIENT)
Facility: HOSPITAL | Age: 28
LOS: 2 days | Discharge: HOME | End: 2018-06-30
Attending: OBSTETRICS & GYNECOLOGY | Admitting: OBSTETRICS & GYNECOLOGY

## 2018-06-27 ENCOUNTER — ROUTINE PRENATAL (OUTPATIENT)
Dept: OBSTETRICS AND GYNECOLOGY | Facility: CLINIC | Age: 28
End: 2018-06-27

## 2018-06-27 VITALS — WEIGHT: 293 LBS | SYSTOLIC BLOOD PRESSURE: 120 MMHG | DIASTOLIC BLOOD PRESSURE: 76 MMHG | BODY MASS INDEX: 50.46 KG/M2

## 2018-06-27 DIAGNOSIS — O99.820 GROUP B STREPTOCOCCAL CARRIAGE COMPLICATING PREGNANCY: ICD-10-CM

## 2018-06-27 DIAGNOSIS — Z34.03 ENCOUNTER FOR SUPERVISION OF NORMAL FIRST PREGNANCY IN THIRD TRIMESTER: ICD-10-CM

## 2018-06-27 DIAGNOSIS — Z37.9 NORMAL LABOR: ICD-10-CM

## 2018-06-27 DIAGNOSIS — O99.213 OBESITY AFFECTING PREGNANCY IN THIRD TRIMESTER: ICD-10-CM

## 2018-06-27 DIAGNOSIS — O99.013 ANEMIA IN PREGNANCY, THIRD TRIMESTER: ICD-10-CM

## 2018-06-27 PROCEDURE — 99213 OFFICE O/P EST LOW 20 MIN: CPT | Performed by: OBSTETRICS & GYNECOLOGY

## 2018-06-27 NOTE — PROGRESS NOTES
Chief Complaint   Patient presents with   • Routine Prenatal Visit       HPI: Jud is a  currently at 40w0d who today reports the following:  Contractions - YES - started this am ~ 06:00.  Becoming more regular; Leaking - No; Vaginal bleeding -  No; Swelling of extremities - No.    ROS:  GI: Nausea - No; Constipation - No; Diarrhea - No    Neuro: Headache - No; Visual change - No      EXAM:  Vitals: See prenatal flowsheet   Abdomen: See prenatal flowsheet   Urine glucose/protein: See prenatal flowsheet   Pelvic: See prenatal flowsheet   MDM:   Impression: 1. Supervision of high risk pregnancy  2. GBS (+)  3. Latent phase labor  4. Anemia in pregnancy   Tests done today: 1. none   Topics discussed: 1. Continue with PNV's  2. Prenatal labs reviewed  3. labor signs and symptoms   Tests scheduled today for her next visit:   none

## 2018-06-28 ENCOUNTER — ANESTHESIA (OUTPATIENT)
Dept: LABOR AND DELIVERY | Facility: HOSPITAL | Age: 28
End: 2018-06-28

## 2018-06-28 ENCOUNTER — ANESTHESIA EVENT (OUTPATIENT)
Dept: LABOR AND DELIVERY | Facility: HOSPITAL | Age: 28
End: 2018-06-28

## 2018-06-28 PROBLEM — O99.213 OBESITY AFFECTING PREGNANCY IN THIRD TRIMESTER: Status: RESOLVED | Noted: 2018-02-08 | Resolved: 2018-06-28

## 2018-06-28 PROBLEM — O99.820 GROUP B STREPTOCOCCAL CARRIAGE COMPLICATING PREGNANCY: Status: RESOLVED | Noted: 2018-05-29 | Resolved: 2018-06-28

## 2018-06-28 PROBLEM — Z34.03 ENCOUNTER FOR SUPERVISION OF NORMAL FIRST PREGNANCY IN THIRD TRIMESTER: Status: RESOLVED | Noted: 2017-11-06 | Resolved: 2018-06-28

## 2018-06-28 PROBLEM — O99.013 ANEMIA IN PREGNANCY, THIRD TRIMESTER: Status: RESOLVED | Noted: 2018-04-25 | Resolved: 2018-06-28

## 2018-06-28 PROBLEM — Z37.9 NORMAL LABOR: Status: ACTIVE | Noted: 2018-06-28

## 2018-06-28 LAB
ABO GROUP BLD: NORMAL
ABO GROUP BLD: NORMAL
BLD GP AB SCN SERPL QL: NEGATIVE
BLD GP AB SCN SERPL QL: NEGATIVE
DEPRECATED RDW RBC AUTO: 45.4 FL (ref 37–54)
ERYTHROCYTE [DISTWIDTH] IN BLOOD BY AUTOMATED COUNT: 13.7 % (ref 11.3–14.5)
HCT VFR BLD AUTO: 36 % (ref 34.5–44)
HGB BLD-MCNC: 11.9 G/DL (ref 11.5–15.5)
MCH RBC QN AUTO: 30 PG (ref 27–31)
MCHC RBC AUTO-ENTMCNC: 33.1 G/DL (ref 32–36)
MCV RBC AUTO: 90.7 FL (ref 80–99)
PLATELET # BLD AUTO: 144 10*3/MM3 (ref 150–450)
PMV BLD AUTO: 12.3 FL (ref 6–12)
RBC # BLD AUTO: 3.97 10*6/MM3 (ref 3.89–5.14)
RH BLD: POSITIVE
RH BLD: POSITIVE
T&S EXPIRATION DATE: NORMAL
T&S EXPIRATION DATE: NORMAL
WBC NRBC COR # BLD: 10.85 10*3/MM3 (ref 3.5–10.8)

## 2018-06-28 PROCEDURE — 59410 OBSTETRICAL CARE: CPT | Performed by: OBSTETRICS & GYNECOLOGY

## 2018-06-28 PROCEDURE — 25010000002 PENICILLIN G POTASSIUM PER 600000 UNITS: Performed by: OBSTETRICS & GYNECOLOGY

## 2018-06-28 PROCEDURE — C1755 CATHETER, INTRASPINAL: HCPCS | Performed by: ANESTHESIOLOGY

## 2018-06-28 PROCEDURE — 85027 COMPLETE CBC AUTOMATED: CPT | Performed by: OBSTETRICS & GYNECOLOGY

## 2018-06-28 PROCEDURE — 86900 BLOOD TYPING SEROLOGIC ABO: CPT | Performed by: OBSTETRICS & GYNECOLOGY

## 2018-06-28 PROCEDURE — 0HQ9XZZ REPAIR PERINEUM SKIN, EXTERNAL APPROACH: ICD-10-PCS | Performed by: OBSTETRICS & GYNECOLOGY

## 2018-06-28 PROCEDURE — 59025 FETAL NON-STRESS TEST: CPT

## 2018-06-28 PROCEDURE — C1755 CATHETER, INTRASPINAL: HCPCS

## 2018-06-28 PROCEDURE — 25010000002 FENTANYL CITRATE (PF) 100 MCG/2ML SOLUTION: Performed by: ANESTHESIOLOGY

## 2018-06-28 PROCEDURE — 86850 RBC ANTIBODY SCREEN: CPT | Performed by: OBSTETRICS & GYNECOLOGY

## 2018-06-28 PROCEDURE — 25010000002 ROPIVACAINE PER 1 MG: Performed by: ANESTHESIOLOGY

## 2018-06-28 PROCEDURE — 86901 BLOOD TYPING SEROLOGIC RH(D): CPT | Performed by: OBSTETRICS & GYNECOLOGY

## 2018-06-28 RX ORDER — DIPHENHYDRAMINE HCL 25 MG
25 CAPSULE ORAL NIGHTLY PRN
Status: DISCONTINUED | OUTPATIENT
Start: 2018-06-28 | End: 2018-06-28 | Stop reason: HOSPADM

## 2018-06-28 RX ORDER — PROMETHAZINE HYDROCHLORIDE 12.5 MG/1
12.5 SUPPOSITORY RECTAL EVERY 6 HOURS PRN
Status: DISCONTINUED | OUTPATIENT
Start: 2018-06-28 | End: 2018-06-28 | Stop reason: HOSPADM

## 2018-06-28 RX ORDER — LIDOCAINE HYDROCHLORIDE AND EPINEPHRINE 15; 5 MG/ML; UG/ML
INJECTION, SOLUTION EPIDURAL AS NEEDED
Status: DISCONTINUED | OUTPATIENT
Start: 2018-06-28 | End: 2018-06-28 | Stop reason: SURG

## 2018-06-28 RX ORDER — BISACODYL 10 MG
10 SUPPOSITORY, RECTAL RECTAL DAILY PRN
Status: DISCONTINUED | OUTPATIENT
Start: 2018-06-29 | End: 2018-06-30 | Stop reason: HOSPADM

## 2018-06-28 RX ORDER — ACETAMINOPHEN 325 MG/1
650 TABLET ORAL EVERY 4 HOURS PRN
Status: DISCONTINUED | OUTPATIENT
Start: 2018-06-28 | End: 2018-06-28 | Stop reason: HOSPADM

## 2018-06-28 RX ORDER — DIPHENHYDRAMINE HYDROCHLORIDE 50 MG/ML
12.5 INJECTION INTRAMUSCULAR; INTRAVENOUS EVERY 8 HOURS PRN
Status: DISCONTINUED | OUTPATIENT
Start: 2018-06-28 | End: 2018-06-28 | Stop reason: HOSPADM

## 2018-06-28 RX ORDER — MORPHINE SULFATE 2 MG/ML
2 INJECTION, SOLUTION INTRAMUSCULAR; INTRAVENOUS
Status: DISCONTINUED | OUTPATIENT
Start: 2018-06-28 | End: 2018-06-28 | Stop reason: HOSPADM

## 2018-06-28 RX ORDER — PROMETHAZINE HYDROCHLORIDE 25 MG/ML
12.5 INJECTION, SOLUTION INTRAMUSCULAR; INTRAVENOUS EVERY 6 HOURS PRN
Status: DISCONTINUED | OUTPATIENT
Start: 2018-06-28 | End: 2018-06-28 | Stop reason: HOSPADM

## 2018-06-28 RX ORDER — DOCUSATE SODIUM 100 MG/1
100 CAPSULE, LIQUID FILLED ORAL DAILY
Status: DISCONTINUED | OUTPATIENT
Start: 2018-06-28 | End: 2018-06-30 | Stop reason: HOSPADM

## 2018-06-28 RX ORDER — ZOLPIDEM TARTRATE 5 MG/1
5 TABLET ORAL NIGHTLY PRN
Status: DISCONTINUED | OUTPATIENT
Start: 2018-06-28 | End: 2018-06-28 | Stop reason: HOSPADM

## 2018-06-28 RX ORDER — SODIUM CHLORIDE 0.9 % (FLUSH) 0.9 %
1-10 SYRINGE (ML) INJECTION AS NEEDED
Status: DISCONTINUED | OUTPATIENT
Start: 2018-06-28 | End: 2018-06-30 | Stop reason: HOSPADM

## 2018-06-28 RX ORDER — ZOLPIDEM TARTRATE 5 MG/1
5 TABLET ORAL NIGHTLY PRN
Status: DISCONTINUED | OUTPATIENT
Start: 2018-06-28 | End: 2018-06-30 | Stop reason: HOSPADM

## 2018-06-28 RX ORDER — IBUPROFEN 600 MG/1
600 TABLET ORAL EVERY 6 HOURS PRN
Status: DISCONTINUED | OUTPATIENT
Start: 2018-06-28 | End: 2018-06-28 | Stop reason: HOSPADM

## 2018-06-28 RX ORDER — SODIUM CHLORIDE 0.9 % (FLUSH) 0.9 %
1-10 SYRINGE (ML) INJECTION AS NEEDED
Status: DISCONTINUED | OUTPATIENT
Start: 2018-06-28 | End: 2018-06-28 | Stop reason: HOSPADM

## 2018-06-28 RX ORDER — OXYTOCIN-SODIUM CHLORIDE 0.9% IV SOLN 30 UNIT/500ML 30-0.9/5 UT/ML-%
2-24 SOLUTION INTRAVENOUS
Status: DISCONTINUED | OUTPATIENT
Start: 2018-06-28 | End: 2018-06-28 | Stop reason: HOSPADM

## 2018-06-28 RX ORDER — OXYTOCIN-SODIUM CHLORIDE 0.9% IV SOLN 30 UNIT/500ML 30-0.9/5 UT/ML-%
650 SOLUTION INTRAVENOUS ONCE
Status: DISCONTINUED | OUTPATIENT
Start: 2018-06-28 | End: 2018-06-28 | Stop reason: HOSPADM

## 2018-06-28 RX ORDER — ROPIVACAINE HYDROCHLORIDE 2 MG/ML
15 INJECTION, SOLUTION EPIDURAL; INFILTRATION; PERINEURAL CONTINUOUS
Status: DISCONTINUED | OUTPATIENT
Start: 2018-06-28 | End: 2018-06-28

## 2018-06-28 RX ORDER — METOCLOPRAMIDE HYDROCHLORIDE 5 MG/ML
10 INJECTION INTRAMUSCULAR; INTRAVENOUS ONCE AS NEEDED
Status: DISCONTINUED | OUTPATIENT
Start: 2018-06-28 | End: 2018-06-28 | Stop reason: HOSPADM

## 2018-06-28 RX ORDER — PENICILLIN G 3000000 [IU]/50ML
3 INJECTION, SOLUTION INTRAVENOUS EVERY 4 HOURS
Status: DISCONTINUED | OUTPATIENT
Start: 2018-06-28 | End: 2018-06-28 | Stop reason: HOSPADM

## 2018-06-28 RX ORDER — LIDOCAINE HYDROCHLORIDE 10 MG/ML
5 INJECTION, SOLUTION EPIDURAL; INFILTRATION; INTRACAUDAL; PERINEURAL AS NEEDED
Status: DISCONTINUED | OUTPATIENT
Start: 2018-06-28 | End: 2018-06-28 | Stop reason: HOSPADM

## 2018-06-28 RX ORDER — OXYTOCIN-SODIUM CHLORIDE 0.9% IV SOLN 30 UNIT/500ML 30-0.9/5 UT/ML-%
2-30 SOLUTION INTRAVENOUS
Status: DISCONTINUED | OUTPATIENT
Start: 2018-06-28 | End: 2018-06-28 | Stop reason: SDUPTHER

## 2018-06-28 RX ORDER — HYDROCODONE BITARTRATE AND ACETAMINOPHEN 5; 325 MG/1; MG/1
1 TABLET ORAL EVERY 4 HOURS PRN
Status: DISCONTINUED | OUTPATIENT
Start: 2018-06-28 | End: 2018-06-29

## 2018-06-28 RX ORDER — SODIUM CHLORIDE, SODIUM LACTATE, POTASSIUM CHLORIDE, CALCIUM CHLORIDE 600; 310; 30; 20 MG/100ML; MG/100ML; MG/100ML; MG/100ML
125 INJECTION, SOLUTION INTRAVENOUS CONTINUOUS
Status: DISCONTINUED | OUTPATIENT
Start: 2018-06-28 | End: 2018-06-28

## 2018-06-28 RX ORDER — OXYCODONE HYDROCHLORIDE AND ACETAMINOPHEN 5; 325 MG/1; MG/1
1 TABLET ORAL EVERY 4 HOURS PRN
Status: DISCONTINUED | OUTPATIENT
Start: 2018-06-28 | End: 2018-06-28 | Stop reason: HOSPADM

## 2018-06-28 RX ORDER — FAMOTIDINE 10 MG/ML
20 INJECTION, SOLUTION INTRAVENOUS EVERY 12 HOURS SCHEDULED
Status: DISCONTINUED | OUTPATIENT
Start: 2018-06-28 | End: 2018-06-28 | Stop reason: CLARIF

## 2018-06-28 RX ORDER — OXYTOCIN-SODIUM CHLORIDE 0.9% IV SOLN 30 UNIT/500ML 30-0.9/5 UT/ML-%
85 SOLUTION INTRAVENOUS ONCE
Status: DISCONTINUED | OUTPATIENT
Start: 2018-06-28 | End: 2018-06-28 | Stop reason: HOSPADM

## 2018-06-28 RX ORDER — IBUPROFEN 600 MG/1
600 TABLET ORAL EVERY 6 HOURS PRN
Status: DISCONTINUED | OUTPATIENT
Start: 2018-06-28 | End: 2018-06-30 | Stop reason: HOSPADM

## 2018-06-28 RX ORDER — DIPHENHYDRAMINE HYDROCHLORIDE 50 MG/ML
25 INJECTION INTRAMUSCULAR; INTRAVENOUS NIGHTLY PRN
Status: DISCONTINUED | OUTPATIENT
Start: 2018-06-28 | End: 2018-06-28 | Stop reason: HOSPADM

## 2018-06-28 RX ORDER — PROMETHAZINE HYDROCHLORIDE 12.5 MG/1
12.5 TABLET ORAL EVERY 6 HOURS PRN
Status: DISCONTINUED | OUTPATIENT
Start: 2018-06-28 | End: 2018-06-28 | Stop reason: HOSPADM

## 2018-06-28 RX ORDER — FAMOTIDINE 20 MG/1
20 TABLET, FILM COATED ORAL EVERY 12 HOURS SCHEDULED
Status: DISCONTINUED | OUTPATIENT
Start: 2018-06-28 | End: 2018-06-28 | Stop reason: HOSPADM

## 2018-06-28 RX ORDER — TRISODIUM CITRATE DIHYDRATE AND CITRIC ACID MONOHYDRATE 500; 334 MG/5ML; MG/5ML
30 SOLUTION ORAL ONCE
Status: DISCONTINUED | OUTPATIENT
Start: 2018-06-28 | End: 2018-06-28 | Stop reason: HOSPADM

## 2018-06-28 RX ORDER — ROPIVACAINE HYDROCHLORIDE 5 MG/ML
INJECTION, SOLUTION EPIDURAL; INFILTRATION; PERINEURAL AS NEEDED
Status: DISCONTINUED | OUTPATIENT
Start: 2018-06-28 | End: 2018-06-28 | Stop reason: SURG

## 2018-06-28 RX ORDER — LANOLIN 100 %
OINTMENT (GRAM) TOPICAL
Status: DISCONTINUED | OUTPATIENT
Start: 2018-06-28 | End: 2018-06-30 | Stop reason: HOSPADM

## 2018-06-28 RX ORDER — FAMOTIDINE 20 MG/1
20 TABLET, FILM COATED ORAL DAILY
Status: DISCONTINUED | OUTPATIENT
Start: 2018-06-28 | End: 2018-06-30 | Stop reason: HOSPADM

## 2018-06-28 RX ORDER — OXYCODONE HYDROCHLORIDE AND ACETAMINOPHEN 5; 325 MG/1; MG/1
2 TABLET ORAL EVERY 4 HOURS PRN
Status: DISCONTINUED | OUTPATIENT
Start: 2018-06-28 | End: 2018-06-28 | Stop reason: HOSPADM

## 2018-06-28 RX ORDER — FENTANYL CITRATE 50 UG/ML
INJECTION, SOLUTION INTRAMUSCULAR; INTRAVENOUS AS NEEDED
Status: DISCONTINUED | OUTPATIENT
Start: 2018-06-28 | End: 2018-06-28 | Stop reason: SURG

## 2018-06-28 RX ORDER — ONDANSETRON 2 MG/ML
4 INJECTION INTRAMUSCULAR; INTRAVENOUS EVERY 6 HOURS PRN
Status: DISCONTINUED | OUTPATIENT
Start: 2018-06-28 | End: 2018-06-28 | Stop reason: HOSPADM

## 2018-06-28 RX ORDER — PRENATAL VIT/IRON FUM/FOLIC AC 27MG-0.8MG
1 TABLET ORAL DAILY
Status: DISCONTINUED | OUTPATIENT
Start: 2018-06-28 | End: 2018-06-30 | Stop reason: HOSPADM

## 2018-06-28 RX ORDER — OXYCODONE HYDROCHLORIDE AND ACETAMINOPHEN 5; 325 MG/1; MG/1
1 TABLET ORAL EVERY 4 HOURS PRN
Status: DISCONTINUED | OUTPATIENT
Start: 2018-06-28 | End: 2018-06-29

## 2018-06-28 RX ORDER — EPHEDRINE SULFATE/0.9% NACL/PF 50 MG/10ML
5 SYRINGE (ML) INTRAVENOUS
Status: DISCONTINUED | OUTPATIENT
Start: 2018-06-28 | End: 2018-06-28 | Stop reason: HOSPADM

## 2018-06-28 RX ORDER — LANSOPRAZOLE
30 KIT EVERY MORNING
Status: DISCONTINUED | OUTPATIENT
Start: 2018-06-29 | End: 2018-06-30 | Stop reason: HOSPADM

## 2018-06-28 RX ORDER — LIDOCAINE HYDROCHLORIDE AND EPINEPHRINE 20; 5 MG/ML; UG/ML
INJECTION, SOLUTION EPIDURAL; INFILTRATION; INTRACAUDAL; PERINEURAL AS NEEDED
Status: DISCONTINUED | OUTPATIENT
Start: 2018-06-28 | End: 2018-06-28 | Stop reason: SURG

## 2018-06-28 RX ADMIN — SODIUM CHLORIDE, POTASSIUM CHLORIDE, SODIUM LACTATE AND CALCIUM CHLORIDE 1000 ML: 600; 310; 30; 20 INJECTION, SOLUTION INTRAVENOUS at 04:50

## 2018-06-28 RX ADMIN — Medication 5 MG: at 07:43

## 2018-06-28 RX ADMIN — HYDROCODONE BITARTRATE AND ACETAMINOPHEN 1 TABLET: 5; 325 TABLET ORAL at 20:21

## 2018-06-28 RX ADMIN — IBUPROFEN 600 MG: 600 TABLET ORAL at 17:18

## 2018-06-28 RX ADMIN — IBUPROFEN 600 MG: 600 TABLET ORAL at 23:54

## 2018-06-28 RX ADMIN — PENICILLIN G 3 MILLION UNITS: 3000000 INJECTION, SOLUTION INTRAVENOUS at 07:36

## 2018-06-28 RX ADMIN — Medication 5 MG: at 05:04

## 2018-06-28 RX ADMIN — ROPIVACAINE HYDROCHLORIDE 15 ML/HR: 2 INJECTION, SOLUTION EPIDURAL; INFILTRATION at 03:09

## 2018-06-28 RX ADMIN — LIDOCAINE HYDROCHLORIDE AND EPINEPHRINE 3 ML: 15; 5 INJECTION, SOLUTION EPIDURAL at 03:09

## 2018-06-28 RX ADMIN — OXYTOCIN-SODIUM CHLORIDE 0.9% IV SOLN 30 UNIT/500ML 85 ML/HR: 30-0.9/5 SOLUTION at 19:02

## 2018-06-28 RX ADMIN — LIDOCAINE HYDROCHLORIDE,EPINEPHRINE BITARTRATE 4 ML: 20; .005 INJECTION, SOLUTION EPIDURAL; INFILTRATION; INTRACAUDAL; PERINEURAL at 12:00

## 2018-06-28 RX ADMIN — Medication 5 MG: at 05:29

## 2018-06-28 RX ADMIN — OXYTOCIN-SODIUM CHLORIDE 0.9% IV SOLN 30 UNIT/500ML 2 MILLI-UNITS/MIN: 30-0.9/5 SOLUTION at 13:26

## 2018-06-28 RX ADMIN — PENICILLIN G 3 MILLION UNITS: 3000000 INJECTION, SOLUTION INTRAVENOUS at 15:44

## 2018-06-28 RX ADMIN — ROPIVACAINE HYDROCHLORIDE 8 ML: 5 INJECTION, SOLUTION EPIDURAL; INFILTRATION; PERINEURAL at 03:09

## 2018-06-28 RX ADMIN — SODIUM CHLORIDE, POTASSIUM CHLORIDE, SODIUM LACTATE AND CALCIUM CHLORIDE 125 ML/HR: 600; 310; 30; 20 INJECTION, SOLUTION INTRAVENOUS at 02:42

## 2018-06-28 RX ADMIN — SODIUM CHLORIDE 5 MILLION UNITS: 900 INJECTION INTRAVENOUS at 02:44

## 2018-06-28 RX ADMIN — Medication 5 MG: at 03:24

## 2018-06-28 RX ADMIN — SODIUM CHLORIDE, POTASSIUM CHLORIDE, SODIUM LACTATE AND CALCIUM CHLORIDE 125 ML/HR: 600; 310; 30; 20 INJECTION, SOLUTION INTRAVENOUS at 07:36

## 2018-06-28 RX ADMIN — Medication 5 MG: at 05:13

## 2018-06-28 RX ADMIN — WITCH HAZEL 1 PAD: 500 SOLUTION RECTAL; TOPICAL at 19:02

## 2018-06-28 RX ADMIN — Medication 5 MG: at 09:49

## 2018-06-28 RX ADMIN — DOCUSATE SODIUM 100 MG: 100 CAPSULE, LIQUID FILLED ORAL at 19:41

## 2018-06-28 RX ADMIN — SODIUM CHLORIDE, POTASSIUM CHLORIDE, SODIUM LACTATE AND CALCIUM CHLORIDE 125 ML/HR: 600; 310; 30; 20 INJECTION, SOLUTION INTRAVENOUS at 13:29

## 2018-06-28 RX ADMIN — FAMOTIDINE 20 MG: 20 TABLET ORAL at 09:24

## 2018-06-28 RX ADMIN — PENICILLIN G 3 MILLION UNITS: 3000000 INJECTION, SOLUTION INTRAVENOUS at 11:44

## 2018-06-28 RX ADMIN — Medication: at 19:02

## 2018-06-28 RX ADMIN — Medication 4 APPLICATION: at 19:03

## 2018-06-28 RX ADMIN — FENTANYL CITRATE 100 MCG: 50 INJECTION, SOLUTION INTRAMUSCULAR; INTRAVENOUS at 03:09

## 2018-06-28 NOTE — ANESTHESIA PROCEDURE NOTES
Labor Epidural    Patient location during procedure: OB  Start Time: 6/28/2018 2:59 AM  Performed By  Anesthesiologist: IMER MACDONALD  Preanesthetic Checklist  Completed: patient identified, site marked, surgical consent, pre-op evaluation, timeout performed, risks and benefits discussed and monitors and equipment checked  Prep:  Pt Position:sitting  Sterile Tech:cap, gloves, mask and sterile barrier  Prep:alcohol swabs  Monitoring:blood pressure monitoring  Epidural Block Procedure:  Approach:midline  Guidance:landmark technique  Location:L3-L4  Needle Type:Tuohy  Needle Gauge:17 G  Loss of Resistance Medium: air  Loss of Resistance: 5cm  Cath Depth at skin:12 cm  Paresthesia: none  Aspiration:negative  Test Dose:negative  Number of Attempts: 1  Post Assessment:  Dressing:occlusive dressing applied and secured with tape  Pt Tolerance:patient tolerated the procedure well with no apparent complications  Complications:no

## 2018-06-28 NOTE — ANESTHESIA PREPROCEDURE EVALUATION
Anesthesia Evaluation     Patient summary reviewed and Nursing notes reviewed                Airway   Mallampati: I  TM distance: <3 FB  Neck ROM: full  no difficulty expected  Dental - normal exam     Pulmonary - negative pulmonary ROS and normal exam   Cardiovascular - negative cardio ROS and normal exam        Neuro/Psych- negative ROS  GI/Hepatic/Renal/Endo    (+) obesity, morbid obesity,      Musculoskeletal (-) negative ROS    Abdominal  - normal exam  (+) obese,     Bowel sounds: normal.   Substance History - negative use     OB/GYN    (+) Pregnant,         Other                        Anesthesia Plan    ASA 3 - emergent     epidural     Anesthetic plan and risks discussed with patient.  Use of blood products discussed with patient .   Plan discussed with attending.

## 2018-06-29 LAB
BASOPHILS # BLD AUTO: 0.02 10*3/MM3 (ref 0–0.2)
BASOPHILS NFR BLD AUTO: 0.2 % (ref 0–1)
DEPRECATED RDW RBC AUTO: 47 FL (ref 37–54)
EOSINOPHIL # BLD AUTO: 0.03 10*3/MM3 (ref 0–0.3)
EOSINOPHIL NFR BLD AUTO: 0.2 % (ref 0–3)
ERYTHROCYTE [DISTWIDTH] IN BLOOD BY AUTOMATED COUNT: 14.1 % (ref 11.3–14.5)
HCT VFR BLD AUTO: 32.5 % (ref 34.5–44)
HGB BLD-MCNC: 10.5 G/DL (ref 11.5–15.5)
IMM GRANULOCYTES # BLD: 0.05 10*3/MM3 (ref 0–0.03)
IMM GRANULOCYTES NFR BLD: 0.4 % (ref 0–0.6)
LYMPHOCYTES # BLD AUTO: 1.73 10*3/MM3 (ref 0.6–4.8)
LYMPHOCYTES NFR BLD AUTO: 14.2 % (ref 24–44)
MCH RBC QN AUTO: 29.7 PG (ref 27–31)
MCHC RBC AUTO-ENTMCNC: 32.3 G/DL (ref 32–36)
MCV RBC AUTO: 91.8 FL (ref 80–99)
MONOCYTES # BLD AUTO: 1.07 10*3/MM3 (ref 0–1)
MONOCYTES NFR BLD AUTO: 8.8 % (ref 0–12)
NEUTROPHILS # BLD AUTO: 9.37 10*3/MM3 (ref 1.5–8.3)
NEUTROPHILS NFR BLD AUTO: 76.6 % (ref 41–71)
PLATELET # BLD AUTO: 141 10*3/MM3 (ref 150–450)
PMV BLD AUTO: 12.2 FL (ref 6–12)
RBC # BLD AUTO: 3.54 10*6/MM3 (ref 3.89–5.14)
WBC NRBC COR # BLD: 12.22 10*3/MM3 (ref 3.5–10.8)

## 2018-06-29 PROCEDURE — 85025 COMPLETE CBC W/AUTO DIFF WBC: CPT | Performed by: OBSTETRICS & GYNECOLOGY

## 2018-06-29 RX ORDER — OXYCODONE HYDROCHLORIDE AND ACETAMINOPHEN 5; 325 MG/1; MG/1
1 TABLET ORAL EVERY 4 HOURS PRN
Status: DISCONTINUED | OUTPATIENT
Start: 2018-06-29 | End: 2018-06-30 | Stop reason: HOSPADM

## 2018-06-29 RX ORDER — OXYCODONE HYDROCHLORIDE AND ACETAMINOPHEN 5; 325 MG/1; MG/1
2 TABLET ORAL EVERY 4 HOURS PRN
Status: DISCONTINUED | OUTPATIENT
Start: 2018-06-29 | End: 2018-06-30 | Stop reason: HOSPADM

## 2018-06-29 RX ADMIN — DOCUSATE SODIUM 100 MG: 100 CAPSULE, LIQUID FILLED ORAL at 09:07

## 2018-06-29 RX ADMIN — PRENATAL VIT W/ FE FUMARATE-FA TAB 27-0.8 MG 1 TABLET: 27-0.8 TAB at 09:07

## 2018-06-29 RX ADMIN — IBUPROFEN 600 MG: 600 TABLET ORAL at 06:19

## 2018-06-29 RX ADMIN — OXYCODONE HYDROCHLORIDE AND ACETAMINOPHEN 1 TABLET: 5; 325 TABLET ORAL at 09:10

## 2018-06-29 RX ADMIN — IBUPROFEN 600 MG: 600 TABLET ORAL at 13:52

## 2018-06-29 RX ADMIN — LANSOPRAZOLE 30 MG: KIT at 06:20

## 2018-06-29 RX ADMIN — IBUPROFEN 600 MG: 600 TABLET ORAL at 19:52

## 2018-06-29 RX ADMIN — HYDROCODONE BITARTRATE AND ACETAMINOPHEN 1 TABLET: 5; 325 TABLET ORAL at 04:03

## 2018-06-29 RX ADMIN — OXYCODONE HYDROCHLORIDE AND ACETAMINOPHEN 1 TABLET: 5; 325 TABLET ORAL at 19:52

## 2018-06-29 NOTE — ANESTHESIA POSTPROCEDURE EVALUATION
Patient: Jud Cerrato    Procedure Summary     Date:  06/28/18 Room / Location:      Anesthesia Start:  0259 Anesthesia Stop:  1622    Procedure:  LABOR ANALGESIA Diagnosis:      Scheduled Providers:   Provider:  Kristen Reyna DO    Anesthesia Type:  epidural ASA Status:  3 - Emergent          Anesthesia Type: epidural  Last vitals  BP   111/74 (06/29/18 0745)   Temp   97.9 °F (36.6 °C) (06/29/18 0745)   Pulse   83 (06/29/18 0745)   Resp   16 (06/29/18 0745)     SpO2   96 % (06/28/18 0803)     Post Anesthesia Care and Evaluation    Patient location during evaluation: bedside  Patient participation: complete - patient participated  Level of consciousness: awake and alert  Pain management: adequate  Airway patency: patent  Anesthetic complications: No anesthetic complications    Cardiovascular status: acceptable  Respiratory status: acceptable  Hydration status: acceptable  Post Neuraxial Block status: Motor and sensory function returned to baseline and No signs or symptoms of PDPH

## 2018-06-30 VITALS
HEIGHT: 64 IN | WEIGHT: 293 LBS | RESPIRATION RATE: 18 BRPM | TEMPERATURE: 98.6 F | OXYGEN SATURATION: 96 % | DIASTOLIC BLOOD PRESSURE: 62 MMHG | HEART RATE: 78 BPM | SYSTOLIC BLOOD PRESSURE: 126 MMHG | BODY MASS INDEX: 50.02 KG/M2

## 2018-06-30 PROCEDURE — 99024 POSTOP FOLLOW-UP VISIT: CPT | Performed by: OBSTETRICS & GYNECOLOGY

## 2018-06-30 RX ORDER — FERROUS SULFATE 325(65) MG
325 TABLET ORAL
Qty: 60 TABLET | Refills: 10 | Status: SHIPPED | OUTPATIENT
Start: 2018-06-30 | End: 2018-08-13

## 2018-06-30 RX ORDER — IBUPROFEN 600 MG/1
600 TABLET ORAL EVERY 6 HOURS PRN
Start: 2018-06-30 | End: 2019-01-31

## 2018-06-30 RX ADMIN — OXYCODONE HYDROCHLORIDE AND ACETAMINOPHEN 1 TABLET: 5; 325 TABLET ORAL at 06:13

## 2018-06-30 RX ADMIN — IBUPROFEN 600 MG: 600 TABLET ORAL at 08:51

## 2018-06-30 RX ADMIN — DOCUSATE SODIUM 100 MG: 100 CAPSULE, LIQUID FILLED ORAL at 08:51

## 2018-06-30 RX ADMIN — OXYCODONE HYDROCHLORIDE AND ACETAMINOPHEN 1 TABLET: 5; 325 TABLET ORAL at 01:56

## 2018-06-30 RX ADMIN — IBUPROFEN 600 MG: 600 TABLET ORAL at 01:56

## 2018-07-17 ENCOUNTER — TELEPHONE (OUTPATIENT)
Dept: OBSTETRICS AND GYNECOLOGY | Facility: CLINIC | Age: 28
End: 2018-07-17

## 2018-07-17 NOTE — TELEPHONE ENCOUNTER
Dr Guerra    Pt calling since had vaginal birth on 6/28 and pt had been having moderate pain in lower back, pelvic pain and pain in top part of leg on right side for the last 4 to 5 days.    Pt call back [phone]

## 2018-07-17 NOTE — TELEPHONE ENCOUNTER
It sounds like low back strain.  Would recommend she is not using Motrin or Advil use that along with a heating pad to the area.  If that doesn't make her better we may want to set her up for physical therapy

## 2018-07-23 NOTE — PROGRESS NOTES
Chief Complaint   Patient presents with   • Routine Prenatal Visit       HPI: Jud is a  currently at 40w1d who today reports the following:  Contractions - No; Leaking - No; Vaginal bleeding -  No; Swelling of extremities - No.    ROS:  GI: Nausea - No; Constipation - No; Diarrhea - No    Neuro: Headache - No; Visual change - No      EXAM:  Vitals: See prenatal flowsheet   Abdomen: See prenatal flowsheet   Urine glucose/protein: See prenatal flowsheet   Pelvic: See prenatal flowsheet   MDM:  Impression: 1. Supervision of low risk pregnancy  2. Decreased fetal movement   Tests done today: 1. NST - reactive   Topics discussed: 1. Continue with PNV's  2. Prenatal labs reviewed  3. labor signs and symptoms   Tests next visit: none   Next visit: See prenatal flowsheet

## 2018-08-07 ENCOUNTER — TELEPHONE (OUTPATIENT)
Dept: OBSTETRICS AND GYNECOLOGY | Facility: CLINIC | Age: 28
End: 2018-08-07

## 2018-08-07 DIAGNOSIS — N39.0 URINARY TRACT INFECTION WITHOUT HEMATURIA, SITE UNSPECIFIED: Primary | ICD-10-CM

## 2018-08-07 NOTE — TELEPHONE ENCOUNTER
donna      Pt still having lower back pain. Had epideral. She has appt on 8/13/18. She not sure if you want her to wait until then to be seen or earlier?

## 2018-08-07 NOTE — TELEPHONE ENCOUNTER
Having pelvic pain as well as back,having and lots of pressure when she urinating, lab order was placed and pt was notified to go to lab

## 2018-08-13 ENCOUNTER — POSTPARTUM VISIT (OUTPATIENT)
Dept: OBSTETRICS AND GYNECOLOGY | Facility: CLINIC | Age: 28
End: 2018-08-13

## 2018-08-13 VITALS
WEIGHT: 282 LBS | DIASTOLIC BLOOD PRESSURE: 68 MMHG | BODY MASS INDEX: 48.41 KG/M2 | SYSTOLIC BLOOD PRESSURE: 118 MMHG | RESPIRATION RATE: 14 BRPM

## 2018-08-13 PROCEDURE — 0503F POSTPARTUM CARE VISIT: CPT | Performed by: OBSTETRICS & GYNECOLOGY

## 2018-08-13 RX ORDER — FLUOXETINE HYDROCHLORIDE 20 MG/1
20 CAPSULE ORAL DAILY
Qty: 30 CAPSULE | Refills: 3 | Status: SHIPPED | OUTPATIENT
Start: 2018-08-13 | End: 2018-09-30

## 2018-08-13 NOTE — PROGRESS NOTES
Subjective   Chief Complaint   Patient presents with   • Postpartum Care     Jud Cerrato is a 27 y.o. year old  presenting to be seen for her postpartum visit.  She had a vaginal delivery.  Her daughter is doing well.    Since delivery she has been sexually active.  She does have concerns about post-partum blues/depression - getting worse with time.  There are no thoughts of hurting herself or hurting others.  She is sleeping well most of the time  [place] Score = 14  She is bottle feeding.  For ongoing contraception, her plans are undecided.    The following portions of the patient's history were reviewed and updated as appropriate:current medications and allergies    Smoking status: Former Smoker                                                              Packs/day: 0.00      Years: 0.00         Types: Cigarettes     Quit date: 3/1/2017  Smokeless tobacco: Never Used                          Review of Systems  Constitutional POS: nothing reported    NEG: anorexia or night sweats   Genitourinary POS: nothing reported    NEG: dysuria or hematuria      Gastointestinal POS: nothing reported    NEG: bloating, change in bowel habits, melena or reflux symptoms   Breast POS: nothing reported    NEG: persistent breast lump, skin dimpling or nipple discharge        Objective   /68   Resp 14   Wt 128 kg (282 lb)   LMP 2017 (Approximate)   Breastfeeding? No   BMI 48.41 kg/m²     General:  well developed; well nourished  no acute distress   Abdomen: soft, non-tender; no masses  no umbilical or inginual hernias are present  no hepato-splenomegaly   Pelvis: Clinical staff was present for exam  External genitalia:  normal appearance of the external genitalia including Bartholin's and [place]'s glands.  :  urethral meatus normal;  Vaginal:  normal pink mucosa without prolapse or lesions.  Cervix:  normal appearance.  Uterus:  normal size, shape and consistency. fully involuted  Adnexa:  normal bimanual  exam of the adnexa.  Rectal:  digital rectal exam not performed; anus visually normal appearing.          Assessment   1. Normal 6 week postpartum exam  2. Postpartum depression - affecting her activities of daily living.     Plan   1. BC options reviewed and compared today: OCP (estrogen/progesterone)  2. The importance of keeping all planned follow-up and taking all medications as prescribed was emphasized.  3. Follow up for recheck of depression 6-8 weeks    New Medications Ordered This Visit   Medications   • FLUoxetine (PROZAC) 20 MG capsule     Sig: Take 1 capsule by mouth Daily.     Dispense:  30 capsule     Refill:  3   • levonorgestrel-ethinyl estradiol (TRIVORA, 28,) per tablet     Sig: Take 1 tablet by mouth Daily.     Dispense:  28 tablet     Refill:  3          This note was electronically signed.    Jovon Guerra M.D.  August 13, 2018    Note: Speech recognition transcription software may have been used to create portions of this document.  An attempt at proofreading has been made but errors in transcription could still be present.

## 2018-09-30 ENCOUNTER — OFFICE VISIT (OUTPATIENT)
Dept: RETAIL CLINIC | Facility: CLINIC | Age: 28
End: 2018-09-30

## 2018-09-30 VITALS
HEART RATE: 65 BPM | BODY MASS INDEX: 48.14 KG/M2 | SYSTOLIC BLOOD PRESSURE: 110 MMHG | OXYGEN SATURATION: 99 % | DIASTOLIC BLOOD PRESSURE: 72 MMHG | RESPIRATION RATE: 18 BRPM | HEIGHT: 64 IN | TEMPERATURE: 98.2 F | WEIGHT: 282 LBS

## 2018-09-30 DIAGNOSIS — H10.31 ACUTE CONJUNCTIVITIS OF RIGHT EYE, UNSPECIFIED ACUTE CONJUNCTIVITIS TYPE: Primary | ICD-10-CM

## 2018-09-30 PROCEDURE — 99213 OFFICE O/P EST LOW 20 MIN: CPT | Performed by: NURSE PRACTITIONER

## 2018-09-30 RX ORDER — TOBRAMYCIN 3 MG/ML
1 SOLUTION/ DROPS OPHTHALMIC EVERY 6 HOURS SCHEDULED
Qty: 5 ML | Refills: 0 | Status: SHIPPED | OUTPATIENT
Start: 2018-09-30 | End: 2018-10-12

## 2018-09-30 NOTE — PROGRESS NOTES
Subjective   Conjunctivitis    Jud Cerrato is a 27 y.o. female who presents with right eye irritation, redness and drainage. Patient works at a .      Conjunctivitis    The current episode started 2 days ago. The onset was gradual. The problem occurs continuously. The problem has been gradually worsening. Nothing relieves the symptoms. Nothing aggravates the symptoms. Associated symptoms include eye itching, congestion, cough, eye discharge and eye redness. Pertinent negatives include no fever, no double vision, no photophobia, no nausea, no vomiting, no headaches, no mouth sores, no rhinorrhea, no sore throat, no swollen glands, no URI, no wheezing, no rash and no eye pain. The right eye is affected.        History Obtained from: Patient    Past Medical History:   Diagnosis Date   • Anxiety     Took Meds before pregnancy    • Chronic constipation 1997   • H/O: depression 2015   • Kidney stone    • PTSD (post-traumatic stress disorder)      Past Surgical History:   Procedure Laterality Date   • COLONOSCOPY  2017   • ENDOSCOPY  2017   • WISDOM TOOTH EXTRACTION  09/2015     Social History     Social History   • Marital status: Single     Spouse name: N/A   • Number of children: N/A   • Years of education: N/A     Occupational History   • Not on file.     Social History Main Topics   • Smoking status: Former Smoker     Years: 2.00     Types: Cigarettes     Quit date: 3/1/2017   • Smokeless tobacco: Never Used   • Alcohol use No   • Drug use: Yes     Types: Marijuana      Comment: last used before 2017   • Sexual activity: Yes     Partners: Male     Birth control/ protection: OCP     Other Topics Concern   • Not on file     Social History Narrative   • No narrative on file     Family History   Problem Relation Age of Onset   • No Known Problems Father    • Ovarian cancer Mother    • Diabetes Mother    • No Known Problems Brother    • No Known Problems Sister    • No Known Problems Son    • No Known Problems  Daughter    • No Known Problems Paternal Grandfather    • No Known Problems Paternal Grandmother    • Hypertension Maternal Grandmother    • Diabetes Maternal Grandfather      No Known Allergies  Current Outpatient Prescriptions   Medication Sig Dispense Refill   • docusate sodium (COLACE) 50 MG capsule Take 100 mg by mouth 2 (Two) Times a Day.     • omeprazole (PRILOSEC) 40 MG capsule Take 1 capsule by mouth Daily. 30 capsule 4   • polyethylene glycol (MIRALAX) powder Take 17 g by mouth Daily. Dissolve in large glass of water and take daily 30 each 12   • raNITIdine (ZANTAC) 150 MG tablet 150 mg Every Night.     • ibuprofen (ADVIL,MOTRIN) 600 MG tablet Take 1 tablet by mouth Every 6 (Six) Hours As Needed for Mild Pain .     • levonorgestrel-ethinyl estradiol (TRIVORA, 28,) per tablet Take 1 tablet by mouth Daily. 28 tablet 3   • tobramycin (TOBREX) 0.3 % solution ophthalmic solution Administer 1 drop to the right eye Every 6 (Six) Hours. 5 mL 0     No current facility-administered medications for this visit.         The following portions of the patient's history were reviewed and updated as appropriate: allergies, current medications, past family history, past medical history, past social history and past surgical history.    Review of Systems   Constitutional: Negative for chills, fatigue and fever.   HENT: Positive for congestion. Negative for mouth sores, postnasal drip, rhinorrhea, sore throat and trouble swallowing.    Eyes: Positive for discharge, redness and itching. Negative for double vision, photophobia, pain and visual disturbance.   Respiratory: Positive for cough. Negative for shortness of breath and wheezing.    Cardiovascular: Negative.    Gastrointestinal: Negative for nausea and vomiting.   Musculoskeletal: Negative.    Skin: Negative for rash.   Allergic/Immunologic: Positive for environmental allergies. Negative for immunocompromised state.   Neurological: Negative for dizziness,  "light-headedness and headaches.   Hematological: Negative.    Psychiatric/Behavioral: Negative.        Objective     VITAL SIGNS:   Vitals:    09/30/18 1530   BP: 110/72   Pulse: 65   Resp: 18   Temp: 98.2 °F (36.8 °C)   SpO2: 99%   Weight: 128 kg (282 lb)   Height: 162.6 cm (64\")   Body mass index is 48.41 kg/m².    Physical Exam   Eyes: Pupils are equal, round, and reactive to light. EOM and lids are normal. Right conjunctiva is injected. No scleral icterus.   Acuity: not corrected (does not have glasses)  Left 20/20  Right 20/30    Neck: Phonation normal. Neck supple. No tracheal deviation present.   Cardiovascular: Normal rate and regular rhythm.    Pulmonary/Chest: Effort normal and breath sounds normal.   Musculoskeletal:   KATZ   Lymphadenopathy:     She has no cervical adenopathy.        Right: No supraclavicular adenopathy present.        Left: No supraclavicular adenopathy present.   Neurological: She is alert. She is not disoriented. Coordination and gait normal.   Skin: Skin is warm and dry. Capillary refill takes less than 2 seconds. No rash noted. No cyanosis.   Psychiatric: She has a normal mood and affect. Her behavior is normal. She is attentive.         CLINICAL QUALITY MEASURES:  Tobacco Screening & Intervention Screened & identified as tobacco non-user. Former smoker   WEIGHT SCREENING/BMI  Not eligible, overweight & managed by other physician     Assessment/Plan     Jud was seen today for conjunctivitis.    Diagnoses and all orders for this visit:    Acute conjunctivitis of right eye, unspecified acute conjunctivitis type  -     tobramycin (TOBREX) 0.3 % solution ophthalmic solution; Administer 1 drop to the right eye Every 6 (Six) Hours.      PLAN:  Patient should follow up with eye care specialist if symptoms fail to improve, worsen or for the development of new symptoms that need attention.    The patient voiced understanding and agreement to the patient treatment plan and instructions "     Patience Rocha, APRN

## 2018-09-30 NOTE — PATIENT INSTRUCTIONS
Viral Conjunctivitis, Adult  Viral conjunctivitis is an inflammation of the clear membrane that covers the white part of your eye and the inner surface of your eyelid (conjunctiva). The inflammation is caused by a viral infection. The blood vessels in the conjunctiva become inflamed, causing the eye to become red or pink, and often itchy. Viral conjunctivitis can be easily passed from one person to another (is contagious). This condition is often called pink eye.  What are the causes?  This condition is caused by a virus. A virus is a type of contagious germ. It can be spread by touching objects that have been contaminated with the virus, such as doorknobs or towels. It can also be passed through droplets, such as from coughing or sneezing.  What are the signs or symptoms?  Symptoms of this condition include:  · Eye redness.  · Tearing or watery eyes.  · Itchy and irritated eyes.  · Burning feeling in the eyes.  · Clear drainage from the eye.  · Swollen eyelids.  · A gritty feeling in the eye.  · Light sensitivity.    This condition often occurs with other symptoms, such as a fever, nausea, or a rash.  How is this diagnosed?  This condition is diagnosed with a medical history and physical exam. If you have discharge from your eye, the discharge may be tested to rule out other causes of conjunctivitis.  How is this treated?  Viral conjunctivitis does not respond to medicines that kill bacteria (antibiotics). Treatment for viral conjunctivitis is directed at stopping a bacterial infection from developing in addition to the viral infection. Treatment also aims to relieve your symptoms, such as itching. This may be done with antihistamine drops or other eye medicines. Rarely, steroid eye drops or antiviral medicines may be prescribed.  Follow these instructions at home:  Medicines    · Take or apply over-the-counter and prescription medicines only as told by your health care provider.  · Be very careful to avoid  touching the edge of the eyelid with the eye drop bottle or ointment tube when applying medicines to the affected eye. Being careful this way will stop you from spreading the infection to the other eye or to other people.  Eye care  · Avoid touching or rubbing your eyes.  · Apply a warm, wet, clean washcloth to your eye for 10-20 minutes, 3-4 times per day or as told by your health care provider.  · If you wear contact lenses, do not wear them until the inflammation is gone and your health care provider says it is safe to wear them again. Ask your health care provider how to sterilize or replace your contact lenses before using them again. Wear glasses until you can resume wearing contacts.  · Avoid wearing eye makeup until the inflammation is gone. Throw away any old eye cosmetics that may be contaminated.  · Gently wipe away any drainage from your eye with a warm, wet washcloth or a cotton ball.  General instructions  · Change or wash your pillowcase every day or as told by your health care provider.  · Do not share towels, pillowcases, washcloths, eye makeup, makeup brushes, contact lenses, or glasses. This may spread the infection.  · Wash your hands often with soap and water. Use paper towels to dry your hands. If soap and water are not available, use hand .  · Try to avoid contact with other people for one week or as told by your health care provider.  Contact a health care provider if:  · Your symptoms do not improve with treatment or they get worse.  · You have increased pain.  · Your vision becomes blurry.  · You have a fever.  · You have facial pain, redness, or swelling.  · You have yellow or green drainage coming from your eye.  · You have new symptoms.  This information is not intended to replace advice given to you by your health care provider. Make sure you discuss any questions you have with your health care provider.  Document Released: 03/09/2004 Document Revised: 07/15/2017 Document  Reviewed: 07/04/2017  [employer] Interactive Patient Education © 2018 [employer]    Keep hands washed often  Avoid touching or rubbing eye  See an eye care specialist right away if you develop worsening symptoms or new symptoms such as pain or change in vision  Follow up if not improving within 2 days

## 2018-10-12 ENCOUNTER — OFFICE VISIT (OUTPATIENT)
Dept: OBSTETRICS AND GYNECOLOGY | Facility: CLINIC | Age: 28
End: 2018-10-12

## 2018-10-12 VITALS — RESPIRATION RATE: 14 BRPM | BODY MASS INDEX: 48.92 KG/M2 | WEIGHT: 285 LBS

## 2018-10-12 PROCEDURE — 99213 OFFICE O/P EST LOW 20 MIN: CPT | Performed by: OBSTETRICS & GYNECOLOGY

## 2018-10-12 NOTE — PROGRESS NOTES
Subjective   Chief Complaint   Patient presents with   • Medication Reaction     Jud Cerrato is a 27 y.o. year old .  Patient's last menstrual period was 10/11/2018 (approximate).  She presents to be seen because of follow-up on her postpartum depression.  As compared to the prior visit she would rate herself at least 70% improved.  She never did take the fluoxetine.  She used it in the past and did not like the way it made her felt.  At this point she is feeling better and does not think medication is necessary.  There are no other issues that are bothering her today that she wishes to discuss    The following portions of the patient's history were reviewed and updated as appropriate:current medications and allergies    Smoking status: Former Smoker                                                              Packs/day: 0.00      Years: 2.00         Types: Cigarettes     Quit date: 3/1/2017  Smokeless tobacco: Never Used                             Objective   Resp 14   Wt 129 kg (285 lb)   LMP 10/11/2018 (Approximate)   Breastfeeding? No   BMI 48.92 kg/m²     Lab Review   No data reviewed    Imaging   No data reviewed        Assessment   1. Postpartum depression improving without medication     Plan   1. Importance of sleep, outdoor exercise and healthy eating to keep depression from being an issue was once again emphasized  2. The importance of keeping all planned follow-up and taking all medications as prescribed was emphasized.  3. Follow up for annual exam 2019           This note was electronically signed.    Jovon Guerra M.D.  2018    Total time spent today with Jud  was 15 minutes (level 2).  Off this time, 90% was spent face-to-face time coordinating care, answering her questions and counseling regarding pathophysiology of her presenting problem along with plans for any diagnositc work-up and treatment.    Note: Speech recognition transcription software may have been  used to create portions of this document.  An attempt at proofreading has been made but errors in transcription could still be present.

## 2018-11-28 ENCOUNTER — LAB REQUISITION (OUTPATIENT)
Dept: LAB | Facility: HOSPITAL | Age: 28
End: 2018-11-28

## 2018-11-28 DIAGNOSIS — Z00.00 ROUTINE GENERAL MEDICAL EXAMINATION AT A HEALTH CARE FACILITY: ICD-10-CM

## 2018-11-28 PROCEDURE — 36415 COLL VENOUS BLD VENIPUNCTURE: CPT | Performed by: INTERNAL MEDICINE

## 2018-12-10 RX ORDER — PRENATAL VIT/IRON FUM/FOLIC AC 27MG-0.8MG
TABLET ORAL
Qty: 30 TABLET | Refills: 12 | OUTPATIENT
Start: 2018-12-10

## 2019-01-10 ENCOUNTER — TELEPHONE (OUTPATIENT)
Dept: OBSTETRICS AND GYNECOLOGY | Facility: CLINIC | Age: 29
End: 2019-01-10

## 2019-01-10 NOTE — TELEPHONE ENCOUNTER
Dr. Guerra patient   [phone] patient has an appointment with Dr. Guerra on 3/22/2019 and is requesting a refill on her birth control Enpresse until her appointment. E-Rx Enpresse sent to [facility] on [address]

## 2019-01-31 ENCOUNTER — OFFICE VISIT (OUTPATIENT)
Dept: RETAIL CLINIC | Facility: CLINIC | Age: 29
End: 2019-01-31

## 2019-01-31 VITALS
SYSTOLIC BLOOD PRESSURE: 128 MMHG | HEART RATE: 83 BPM | OXYGEN SATURATION: 98 % | BODY MASS INDEX: 50.02 KG/M2 | TEMPERATURE: 98.7 F | HEIGHT: 64 IN | RESPIRATION RATE: 18 BRPM | WEIGHT: 293 LBS | DIASTOLIC BLOOD PRESSURE: 86 MMHG

## 2019-01-31 DIAGNOSIS — H10.31 ACUTE BACTERIAL CONJUNCTIVITIS OF RIGHT EYE: Primary | ICD-10-CM

## 2019-01-31 PROCEDURE — 99213 OFFICE O/P EST LOW 20 MIN: CPT | Performed by: NURSE PRACTITIONER

## 2019-01-31 RX ORDER — IBUPROFEN 800 MG/1
TABLET ORAL
COMMUNITY
Start: 2018-12-09 | End: 2021-08-31

## 2019-01-31 RX ORDER — OMEPRAZOLE 20 MG/1
CAPSULE, DELAYED RELEASE ORAL
COMMUNITY
Start: 2019-01-19 | End: 2020-10-23

## 2019-01-31 RX ORDER — POLYETHYLENE GLYCOL 3350 17 G/17G
POWDER, FOR SOLUTION ORAL
COMMUNITY
Start: 2018-12-09 | End: 2019-02-04 | Stop reason: SDUPTHER

## 2019-01-31 RX ORDER — OMEPRAZOLE 40 MG/1
40 CAPSULE, DELAYED RELEASE ORAL DAILY
COMMUNITY
End: 2019-03-22

## 2019-01-31 RX ORDER — POLYMYXIN B SULFATE AND TRIMETHOPRIM 1; 10000 MG/ML; [USP'U]/ML
1 SOLUTION OPHTHALMIC EVERY 4 HOURS
Qty: 10 ML | Refills: 0 | Status: SHIPPED | OUTPATIENT
Start: 2019-01-31 | End: 2019-02-07

## 2019-01-31 NOTE — PROGRESS NOTES
"Ronnell Cerrato is a 28 y.o. female.   Chief Complaint   Patient presents with   • Conjunctivitis      27 yo female presents with complaint of right eye redness, matted in am, purulent drainage, denies pain or vision changes.  Refer to HPI/ROS for additional information.      Conjunctivitis    The current episode started today. The onset was sudden. The problem has been rapidly worsening. Nothing relieves the symptoms. Associated symptoms include eye discharge and eye redness. Pertinent negatives include no eye itching and no eye pain.        The following portions of the patient's history were reviewed and updated as appropriate: allergies, current medications, past family history, past medical history, past social history, past surgical history and problem list.    Current Outpatient Medications:   •  docusate sodium (COLACE) 50 MG capsule, Take 100 mg by mouth 2 (Two) Times a Day., Disp: , Rfl:   •  ibuprofen (ADVIL,MOTRIN) 800 MG tablet, , Disp: , Rfl:   •  Levonorg-Eth Estrad Triphasic (ENPRESSE-28 PO), Take  by mouth., Disp: , Rfl:   •  omeprazole (priLOSEC) 20 MG capsule, , Disp: , Rfl:   •  omeprazole (priLOSEC) 40 MG capsule, Take 40 mg by mouth Daily., Disp: , Rfl:   •  polyethylene glycol (MIRALAX) packet, , Disp: , Rfl:   •  raNITIdine (ZANTAC) 150 MG tablet, 150 mg Every Night., Disp: , Rfl:   •  trimethoprim-polymyxin b (POLYTRIM) 75197-3.1 UNIT/ML-% ophthalmic solution, Administer 1 drop to the right eye Every 4 (Four) Hours for 7 days., Disp: 10 mL, Rfl: 0    Review of Systems   Constitutional: Negative.    HENT: Negative.    Eyes: Positive for discharge and redness. Negative for pain, itching and visual disturbance.   Cardiovascular: Negative.    Skin: Negative.    Psychiatric/Behavioral: Negative.      /86 (BP Location: Left arm, Patient Position: Sitting)   Pulse 83   Temp 98.7 °F (37.1 °C) (Oral)   Resp 18   Ht 162.6 cm (64\")   Wt (!) 137 kg (301 lb)   SpO2 98%   BMI 51.67 " kg/m²     Objective   No Known Allergies    Physical Exam   Constitutional: She is oriented to person, place, and time. She appears well-developed and well-nourished. No distress.   HENT:   Head: Normocephalic.   Right Ear: External ear normal.   Left Ear: External ear normal.   Nose: Nose normal.   Mouth/Throat: Oropharynx is clear and moist.   Eyes: EOM and lids are normal. Pupils are equal, round, and reactive to light. Right conjunctiva is injected.   Neck: Normal range of motion. Neck supple.   Cardiovascular: Regular rhythm and normal heart sounds.   Neurological: She is alert and oriented to person, place, and time.   Skin: Skin is warm and dry. She is not diaphoretic.   Psychiatric: She has a normal mood and affect. Her behavior is normal. Judgment and thought content normal.   Vitals reviewed.      Assessment/Plan   Jud was seen today for conjunctivitis.    Diagnoses and all orders for this visit:    Acute bacterial conjunctivitis of right eye    Other orders  -     trimethoprim-polymyxin b (POLYTRIM) 00524-2.1 UNIT/ML-% ophthalmic solution; Administer 1 drop to the right eye Every 4 (Four) Hours for 7 days.           An After Visit Summary was printed, reviewed, and given to the patient. Understanding verbalized and agrees with treatment plan.  If no improvement or becomes worse, follow up with primary or go to [facility].        January 31, 2019 6:58 PM

## 2019-02-04 ENCOUNTER — TELEPHONE (OUTPATIENT)
Dept: OBSTETRICS AND GYNECOLOGY | Facility: CLINIC | Age: 29
End: 2019-02-04

## 2019-02-04 RX ORDER — FLUOXETINE HYDROCHLORIDE 20 MG/1
CAPSULE ORAL
Qty: 30 CAPSULE | Refills: 3 | OUTPATIENT
Start: 2019-02-04

## 2019-02-04 RX ORDER — POLYETHYLENE GLYCOL 3350 17 G/17G
17 POWDER, FOR SOLUTION ORAL DAILY PRN
Qty: 30 PACKET | Refills: 2 | Status: SHIPPED | OUTPATIENT
Start: 2019-02-04 | End: 2020-01-09

## 2019-03-22 ENCOUNTER — OFFICE VISIT (OUTPATIENT)
Dept: OBSTETRICS AND GYNECOLOGY | Facility: CLINIC | Age: 29
End: 2019-03-22

## 2019-03-22 VITALS
RESPIRATION RATE: 14 BRPM | SYSTOLIC BLOOD PRESSURE: 122 MMHG | BODY MASS INDEX: 52.7 KG/M2 | WEIGHT: 293 LBS | DIASTOLIC BLOOD PRESSURE: 80 MMHG

## 2019-03-22 DIAGNOSIS — Z01.419 WELL WOMAN EXAM: Primary | ICD-10-CM

## 2019-03-22 PROCEDURE — 99395 PREV VISIT EST AGE 18-39: CPT | Performed by: OBSTETRICS & GYNECOLOGY

## 2019-03-22 NOTE — PROGRESS NOTES
Subjective   Chief Complaint   Patient presents with   • Gynecologic Exam     Jud Cerrato is a 28 y.o. year old  presenting to be seen for her annual exam.     SEXUAL Hx:  She is currently sexually active.  In the past year there there has been NO new sexual partners.    Condoms are never used.  She would not like to be screened for STD's at today's exam.  Current birth control method: OCP (estrogen/progesterone).  She is happy with her current method of contraception and does not want to discuss alternative methods of contraception.  MENSTRUAL Hx:  Patient's last menstrual period was 2019 (approximate).  In the past 6 months her cycles have been regular, predictable and occur monthly.  Her menstrual flow is typically normal.   Each month on average there are roughly 0 day(s) of very heavy flow.    Intermenstrual bleeding is absent.    Post-coital bleeding is absent.  Dysmenorrhea: mild and is not affecting her activities of daily living  PMS: is not affecting her activities of daily living  Her cycles are not a source of concern for her that she wishes to discuss today.  HEALTH Hx:  She exercises regularly: no (and has no plans to become more active).  She wears her seat belt: yes.  She has concerns about domestic violence: no.  OTHER THINGS SHE WANTS TO DISCUSS TODAY:  Nothing else    The following portions of the patient's history were reviewed and updated as appropriate:problem list, current medications, allergies, past family history, past medical history, past social history and past surgical history.    Social History    Tobacco Use      Smoking status: Former Smoker        Types: Cigarettes        Start date:         Quit date: 3/1/2017        Years since quittin.0      Smokeless tobacco: Never Used    Review of Systems  Constitutional POS: nothing reported    NEG: anorexia or night sweats   Genitourinary POS: urge incontinene is present but it IS NOT effecting her ADL's    NEG:  dysuria or hematuria      Gastointestinal POS: constipation (chronic)    NEG: bloating, change in bowel habits, melena or reflux symptoms   Integument POS: nothing reported    NEG: moles that are changing in size, shape, color or rashes   Breast POS: nothing reported    NEG: persistent breast lump, skin dimpling or nipple discharge        Objective   /80   Resp 14   Wt (!) 139 kg (307 lb)   LMP 03/08/2019 (Approximate)   Breastfeeding? No   BMI 52.70 kg/m²     General:  well developed; well nourished  no acute distress   Skin:  No suspicious lesions seen   Thyroid: normal to inspection and palpation   Breasts:  Examined in supine position  Symmetric without masses or skin dimpling  Nipples normal without inversion, lesions or discharge  There are no palpable axillary nodes   Abdomen: soft, non-tender; no masses  no umbilical or inguinal hernias are present  no hepato-splenomegaly   Pelvis: Exam limited by  body habitus  Clinical staff was present for exam  External genitalia:  normal appearance of the external genitalia including Bartholin's and [place]'s glands.  :  urethral meatus normal;  Vaginal:  normal pink mucosa without prolapse or lesions.  Cervix:  normal appearance.  Uterus:  normal size, shape and consistency.  Adnexa:  non palpable bilaterally.  Rectal:  digital rectal exam not performed; anus visually normal appearing.        Assessment   1. Normal GYN exam limited by weight     Plan   1. Pap was not done today.  I explained to Jud that the recommendations for Pap smear interval in a low risk patient has lengthened to 3 years time.  I told Jud she still needs to be seen in our office yearly for a full physical including breast and pelvic exam.  2. The importance of keeping all planned follow-up and taking all medications as prescribed was emphasized.  3. Follow up for annual exam 1 year    New Medications Ordered This Visit   Medications   • levonorgestrel-ethinyl estradiol (ENPRESSE-28)  per tablet     Sig: Take 1 tablet by mouth Daily.     Dispense:  28 tablet     Refill:  12          This note was electronically signed.    Jovon Guerra M.D.  March 22, 2019    Note: Speech recognition transcription software may have been used to create portions of this document.  An attempt at proofreading has been made but errors in transcription could still be present.

## 2020-01-09 ENCOUNTER — OFFICE VISIT (OUTPATIENT)
Dept: RETAIL CLINIC | Facility: CLINIC | Age: 30
End: 2020-01-09

## 2020-01-09 VITALS
RESPIRATION RATE: 20 BRPM | HEART RATE: 68 BPM | TEMPERATURE: 98 F | DIASTOLIC BLOOD PRESSURE: 78 MMHG | WEIGHT: 293 LBS | BODY MASS INDEX: 52.49 KG/M2 | OXYGEN SATURATION: 98 % | SYSTOLIC BLOOD PRESSURE: 120 MMHG

## 2020-01-09 DIAGNOSIS — J02.0 STREP THROAT: Primary | ICD-10-CM

## 2020-01-09 LAB
EXPIRATION DATE: ABNORMAL
EXPIRATION DATE: NORMAL
FLUAV AG NPH QL: NEGATIVE
FLUBV AG NPH QL: NEGATIVE
INTERNAL CONTROL: ABNORMAL
INTERNAL CONTROL: NORMAL
Lab: ABNORMAL
Lab: NORMAL
S PYO AG THROAT QL: POSITIVE

## 2020-01-09 PROCEDURE — 87804 INFLUENZA ASSAY W/OPTIC: CPT | Performed by: NURSE PRACTITIONER

## 2020-01-09 PROCEDURE — 99213 OFFICE O/P EST LOW 20 MIN: CPT | Performed by: NURSE PRACTITIONER

## 2020-01-09 PROCEDURE — 87880 STREP A ASSAY W/OPTIC: CPT | Performed by: NURSE PRACTITIONER

## 2020-01-09 RX ORDER — AMOXICILLIN AND CLAVULANATE POTASSIUM 875; 125 MG/1; MG/1
1 TABLET, FILM COATED ORAL 2 TIMES DAILY
Qty: 20 TABLET | Refills: 0 | Status: SHIPPED | OUTPATIENT
Start: 2020-01-09 | End: 2020-01-19

## 2020-01-09 RX ORDER — METHYLPREDNISOLONE 4 MG/1
TABLET ORAL
Qty: 21 TABLET | Refills: 0 | Status: SHIPPED | OUTPATIENT
Start: 2020-01-09 | End: 2020-08-19

## 2020-01-09 RX ORDER — SUMATRIPTAN 50 MG/1
TABLET, FILM COATED ORAL
COMMUNITY
Start: 2019-10-09 | End: 2020-08-19

## 2020-01-09 NOTE — PROGRESS NOTES
Subjective   Chief Complaint   Patient presents with   • Sore Throat       Jud Cerrato is a 29 y.o. female.     URI    This is a new problem. Episode onset: 2 days. The problem has been unchanged. Maximum temperature: tactile. Associated symptoms include abdominal pain, headaches, nausea and a sore throat. Pertinent negatives include no congestion, coughing, diarrhea, rhinorrhea, sneezing or vomiting. She has tried nothing for the symptoms.        No Known Allergies    Past Medical History:   Diagnosis Date   • Anxiety     Off meds ~ 2018   • Chronic constipation    • Gastritis    • H/O: depression    • History of kidney stones    • PTSD (post-traumatic stress disorder) - from uncle's death        Past Surgical History:   Procedure Laterality Date   • COLONOSCOPY     • ENDOSCOPY     • WISDOM TOOTH EXTRACTION  2015       Social History     Socioeconomic History   • Marital status: Single     Spouse name: Not on file   • Number of children: Not on file   • Years of education: Not on file   • Highest education level: Not on file   Tobacco Use   • Smoking status: Former Smoker     Types: Cigarettes     Start date:      Last attempt to quit: 3/1/2017     Years since quittin.8   • Smokeless tobacco: Never Used   Substance and Sexual Activity   • Alcohol use: No   • Drug use: Defer   • Sexual activity: Defer     Partners: Male     Birth control/protection: OCP       Family History   Problem Relation Age of Onset   • No Known Problems Father    • Diabetes Mother    • No Known Problems Brother    • No Known Problems Sister    • No Known Problems Son    • No Known Problems Daughter    • No Known Problems Paternal Grandfather    • No Known Problems Paternal Grandmother    • Hypertension Maternal Grandmother    • Diabetes Maternal Grandfather          Current Outpatient Medications:   •  docusate sodium (COLACE) 50 MG capsule, Take 100 mg by mouth 2 (Two) Times a Day., Disp: , Rfl:    •  ibuprofen (ADVIL,MOTRIN) 800 MG tablet, , Disp: , Rfl:   •  levonorgestrel-ethinyl estradiol (ENPRESSE-28) per tablet, Take 1 tablet by mouth Daily., Disp: 28 tablet, Rfl: 12  •  omeprazole (priLOSEC) 20 MG capsule, , Disp: , Rfl:   •  amoxicillin-clavulanate (AUGMENTIN) 875-125 MG per tablet, Take 1 tablet by mouth 2 (Two) Times a Day for 10 days., Disp: 20 tablet, Rfl: 0  •  methylPREDNISolone (MEDROL, TESS,) 4 MG tablet, Take as directed on package instructions., Disp: 21 tablet, Rfl: 0  •  SUMAtriptan (IMITREX) 50 MG tablet, TAKE ONE TABLET BY MOUTH AT ONSET OF HEADACHE. IF SYMPTOMS PERSIST A SECOND DOSE MAY BE TAKEN IN 2 HOURS. DO NOT EXCEED 2 DOSES IN A 24 HOUR, Disp: , Rfl:       Review of Systems   Constitutional: Positive for diaphoresis and fatigue. Negative for chills and fever.   HENT: Positive for sore throat. Negative for congestion, rhinorrhea and sneezing.    Respiratory: Negative for cough.    Gastrointestinal: Positive for abdominal pain and nausea. Negative for diarrhea and vomiting.   Musculoskeletal: Positive for myalgias.   Neurological: Positive for headache.        Vitals:    01/09/20 1012   BP: 120/78   Pulse: 68   Resp: 20   Temp: 98 °F (36.7 °C)   SpO2: 98%   Weight: (!) 139 kg (305 lb 12.8 oz)       Objective   Physical Exam   Constitutional: She is oriented to person, place, and time. She appears well-developed and well-nourished.   HENT:   Head: Normocephalic.   Right Ear: Tympanic membrane, external ear and ear canal normal.   Left Ear: Tympanic membrane, external ear and ear canal normal.   Nose: Nose normal.   Mouth/Throat: Mucous membranes are normal. Posterior oropharyngeal erythema present. Tonsils are 3+ on the right. Tonsils are 3+ on the left. Tonsillar exudate (right).   Eyes: Conjunctivae are normal.   Cardiovascular: Normal rate, regular rhythm, S1 normal, S2 normal and normal heart sounds.   Pulmonary/Chest: Effort normal and breath sounds normal.   Abdominal: Soft.  Normal appearance. There is no tenderness.   Lymphadenopathy:        Head (right side): Tonsillar adenopathy present.        Head (left side): Tonsillar adenopathy present.     She has no cervical adenopathy.   Neurological: She is alert and oriented to person, place, and time.        Procedures     Assessment/Plan   Jud was seen today for sore throat.    Diagnoses and all orders for this visit:    Strep throat  -     amoxicillin-clavulanate (AUGMENTIN) 875-125 MG per tablet; Take 1 tablet by mouth 2 (Two) Times a Day for 10 days.  -     methylPREDNISolone (MEDROL, TESS,) 4 MG tablet; Take as directed on package instructions.  -     POCT rapid strep A  -     POCT Influenza A/B        Results for orders placed or performed in visit on 01/09/20   POCT rapid strep A   Result Value Ref Range    Rapid Strep A Screen Positive (A) Negative, VALID, INVALID, Not Performed    Internal Control Passed Passed    Lot Number [id number]     Expiration Date 4/3/22    POCT Influenza A/B   Result Value Ref Range    Rapid Influenza A Ag Negative Negative    Rapid Influenza B Ag Negative Negative    Internal Control Passed Passed    Lot Number [id number]     Expiration Date 6/30/21        PLAN: Discussed dosing, side effects, recommended other symptomatic care.  Patient should follow up with primary care provider if symptoms worsen, fail to resolve or other symptoms need attention. Patient/family agree to the above. Advised to alternate tylenol and motrin for pain and/or fever, change toothbrush, stay hydrated and rest.     An After Visit Summary was printed and given to the patient.    RYANNE Harris

## 2020-05-11 RX ORDER — LEVONORGESTREL AND ETHINYL ESTRADIOL 6-5-10
KIT ORAL
Qty: 28 TABLET | Refills: 2 | Status: SHIPPED | OUTPATIENT
Start: 2020-05-11 | End: 2020-08-19

## 2020-08-19 ENCOUNTER — RESULTS ENCOUNTER (OUTPATIENT)
Dept: OBSTETRICS AND GYNECOLOGY | Facility: CLINIC | Age: 30
End: 2020-08-19

## 2020-08-19 ENCOUNTER — OFFICE VISIT (OUTPATIENT)
Dept: OBSTETRICS AND GYNECOLOGY | Facility: CLINIC | Age: 30
End: 2020-08-19

## 2020-08-19 VITALS
RESPIRATION RATE: 14 BRPM | WEIGHT: 293 LBS | DIASTOLIC BLOOD PRESSURE: 74 MMHG | SYSTOLIC BLOOD PRESSURE: 116 MMHG | BODY MASS INDEX: 53.73 KG/M2

## 2020-08-19 DIAGNOSIS — Z71.85 VACCINE COUNSELING: ICD-10-CM

## 2020-08-19 DIAGNOSIS — Z01.419 WELL WOMAN EXAM: ICD-10-CM

## 2020-08-19 DIAGNOSIS — Z01.419 WELL WOMAN EXAM: Primary | ICD-10-CM

## 2020-08-19 DIAGNOSIS — Z11.59 ENCOUNTER FOR HEPATITIS C SCREENING TEST FOR LOW RISK PATIENT: ICD-10-CM

## 2020-08-19 PROCEDURE — 99395 PREV VISIT EST AGE 18-39: CPT | Performed by: OBSTETRICS & GYNECOLOGY

## 2020-08-19 NOTE — PROGRESS NOTES
Subjective   Chief Complaint   Patient presents with   • Gynecologic Exam     Jud Cerrato is a 29 y.o. year old  presenting to be seen for her annual exam.  She stopped her birth control was recently because she has been a bit forgetful at times.    SEXUAL Hx:  She is currently sexually active.  In the past year there there has been NO new sexual partners.    Condoms are never used.  She would not like to be screened for STD's at today's exam.  She is happy with her current method of contraception and does want to discuss alternative methods of contraception.  MENSTRUAL Hx:  Patient's last menstrual period was 08/10/2020 (approximate).  So long as she takes her pills correctly, in the past 6 months her cycles have been regular, predictable and occur monthly.  Her menstrual flow is typically normal.   Each month on average there are roughly 0 day(s) of very heavy flow.  Intermenstrual bleeding is present - Whenever she misses her pills.    Post-coital bleeding is absent.  Dysmenorrhea: is not affecting her activities of daily living  PMS: is not affecting her activities of daily living  Her cycles are not a source of concern for her that she wishes to discuss today.  HEALTH Hx:  She exercises regularly: no (and has no plans to become more active).  She wears her seat belt: yes.  She has concerns about domestic violence: no.  OTHER THINGS SHE WANTS TO DISCUSS TODAY:  Nothing else    The following portions of the patient's history were reviewed and updated as appropriate:problem list, current medications, allergies, past family history, past medical history, past social history and past surgical history.    Social History    Tobacco Use      Smoking status: Former Smoker        Types: Cigarettes        Start date:         Quit date: 3/1/2017        Years since quitting: 3.4      Smokeless tobacco: Never Used    Review of Systems  Constitutional POS: nothing reported    NEG: anorexia or night sweats    Genitourinary POS: nothing reported    NEG: dysuria or hematuria      Gastointestinal POS: constipation (chronic)    NEG: bloating, change in bowel habits, melena or reflux symptoms   Integument POS: nothing reported    NEG: moles that are changing in size, shape, color or rashes   Breast POS: nothing reported    NEG: persistent breast lump, skin dimpling or nipple discharge        Objective   /74   Resp 14   Wt (!) 142 kg (313 lb)   LMP 08/10/2020 (Approximate)   Breastfeeding No   BMI 53.73 kg/m²     General:  well developed; well nourished  no acute distress   Skin:  No suspicious lesions seen   Thyroid: normal to inspection and palpation   Breasts:  Examined in supine position  Symmetric without masses or skin dimpling  Nipples normal without inversion, lesions or discharge  There are no palpable axillary nodes   Abdomen: soft, non-tender; no masses  no umbilical or inguinal hernias are present  no hepato-splenomegaly   Pelvis: Exam limited by  body habitus  Clinical staff was present for exam  External genitalia:  normal appearance of the external genitalia including Bartholin's and Savona's glands.  :  urethral meatus normal;  Vaginal:  normal pink mucosa without prolapse or lesions.  Cervix:  normal appearance.  Uterus:  not palpable.  Adnexa:  non palpable bilaterally.  Rectal:  digital rectal exam not performed; anus visually normal appearing.        Assessment   1. Normal GYN exam  2. Suboptimal birth control with poor pill compliance  3. Morbid obesity.  This may impact effectiveness rates of any systemic hormonal birth control product     Plan   1. Pap was done today.  If she does not receive the results of the Pap within 2 weeks  time, she was instructed to call to find out the results.  I explained to Jud that the recommendations for Pap smear interval in a low risk patient's has lengthened to 3 years time.  I encouraged her to be seen yearly for a full physical exam including breast  and pelvic exam even during the off years when PAP's will not be performed.  2. I discussed with Jud that she may be behind on needed vaccinations for TDAP.  She may be able to obtain these vaccinations at her local pharmacy OR speak about obtaining them with her primary care.  If she does obtain her vaccines, I have asked Jud to let us know the date each vaccine was obtained so that her medical record could be updated in our system.  3. The following tests were ordered today: Hep C Ab, lipid profile, TSH and vitamin D 25-OH.  It was explained to Jud that all lab test should be back within the one week after they are performed. She will be notified about the results, regardless of the findings. If she has not been contacted by the office within 2 weeks after the test has been performed, it is her responsibility to contact us to learn about her results.  4. Spoke to her today about birth control options with strong emphasis on Mirena as a way to provide long-term effective and reversible contraception along with cycle control.  Given her weight I think she is at increased risk for endometrial cancer.  I think this risk could be offset by the use of a progestin containing IUD.  5. The importance of keeping all planned follow-up and taking all medications as prescribed was emphasized.  6. Follow up for IUD placement         This note was electronically signed.    Jovon Guerra M.D.  August 19, 2020    Note: Speech recognition transcription software may have been used to create portions of this document.  An attempt at proofreading has been made but errors in transcription could still be present.

## 2020-08-19 NOTE — PATIENT INSTRUCTIONS
Tdap Vaccine (Tetanus, Diphtheria and Pertussis): What You Need to Know  1. Why get vaccinated?  Tetanus, diphtheria and pertussis are very serious diseases. Tdap vaccine can protect us from these diseases. And, Tdap vaccine given to pregnant women can protect  babies against pertussis..  TETANUS (Lockjaw) is rare in the [place] today. It causes painful muscle tightening and stiffness, usually all over the body.  · It can lead to tightening of muscles in the head and neck so you can't open your mouth, swallow, or sometimes even breathe. Tetanus kills about 1 out of 10 people who are infected even after receiving the best medical care.  DIPHTHERIA is also rare in the [place] today. It can cause a thick coating to form in the back of the throat.  · It can lead to breathing problems, heart failure, paralysis, and death.  PERTUSSIS (Whooping Cough) causes severe coughing spells, which can cause difficulty breathing, vomiting and disturbed sleep.  · It can also lead to weight loss, incontinence, and rib fractures. Up to 2 in 100 adolescents and 5 in 100 adults with pertussis are hospitalized or have complications, which could include pneumonia or death.    These diseases are caused by bacteria. Diphtheria and pertussis are spread from person to person through secretions from coughing or sneezing. Tetanus enters the body through cuts, scratches, or wounds.  Before vaccines, as many as 200,000 cases of diphtheria, 200,000 cases of pertussis, and hundreds of cases of tetanus, were reported in the [place] each year. Since vaccination began, reports of cases for tetanus and diphtheria have dropped by about 99% and for pertussis by about 80%.    2. Tdap vaccine  • Tdap vaccine can protect adolescents and adults from tetanus, diphtheria, and pertussis. One dose of Tdap is routinely given at age 11 or 12. People who did not get Tdap at that age should get it as soon as possible.  • Tdap is especially  important for healthcare professionals and anyone having close contact with a baby younger than 12 months.  • Pregnant women should get a dose of Tdap during every pregnancy, to protect the  from pertussis. Infants are most at risk for severe, life-threatening complications from pertussis.  • Another vaccine, called Td, protects against tetanus and diphtheria, but not pertussis. A Td booster should be given every 10 years. Tdap may be given as one of these boosters if you have never gotten Tdap before. Tdap may also be given after a severe cut or burn to prevent tetanus infection.  • Your doctor or the person giving you the vaccine can give you more information.  • Tdap may safely be given at the same time as other vaccines.    3. Some people should not get this vaccine  · A person who has ever had a life-threatening allergic reaction after a previous dose of any diphtheria, tetanus or pertussis containing vaccine, OR has a severe allergy to any part of this vaccine, should not get Tdap vaccine. Tell the person giving the vaccine about any severe allergies.  · Anyone who had coma or long repeated seizures within 7 days after a childhood dose of DTP or DTaP, or a previous dose of Tdap, should not get Tdap, unless a cause other than the vaccine was found. They can still get Td.  · Talk to your doctor if you:  ? have seizures or another nervous system problem,  ? had severe pain or swelling after any vaccine containing diphtheria, tetanus or pertussis,  ? ever had a condition called Guillain-Barré Syndrome (GBS),  ? aren't feeling well on the day the shot is scheduled.    4. Risks  With any medicine, including vaccines, there is a chance of side effects. These are usually mild and go away on their own. Serious reactions are also possible but are rare.  Most people who get Tdap vaccine do not have any problems with it.  Mild problems following Tdap  (Did not interfere with activities)  · Pain where the shot was  given (about 3 in 4 adolescents or 2 in 3 adults)  · Redness or swelling where the shot was given (about 1 person in 5)  · Mild fever of at least 100.4°F (up to about 1 in 25 adolescents or 1 in 100 adults)  · Headache (about 3 or 4 people in 10)  · Tiredness (about 1 person in 3 or 4)  · Nausea, vomiting, diarrhea, stomach ache (up to 1 in 4 adolescents or 1 in 10 adults)  · Chills, sore joints (about 1 person in 10)  · Body aches (about 1 person in 3 or 4)  · Rash, swollen glands (uncommon)  Moderate problems following Tdap  (Interfered with activities, but did not require medical attention)  · Pain where the shot was given (up to 1 in 5 or 6)  · Redness or swelling where the shot was given (up to about 1 in 16 adolescents or 1 in 12 adults)  · Fever over 102°F (about 1 in 100 adolescents or 1 in 250 adults)  · Headache (about 1 in 7 adolescents or 1 in 10 adults)  · Nausea, vomiting, diarrhea, stomach ache (up to 1 or 3 people in 100)  · Swelling of the entire arm where the shot was given (up to about 1 in 500).  Severe problems following Tdap  (Unable to perform usual activities; required medical attention)  · Swelling, severe pain, bleeding and redness in the arm where the shot was given (rare).  Problems that could happen after any vaccine:  · People sometimes faint after a medical procedure, including vaccination. Sitting or lying down for about 15 minutes can help prevent fainting, and injuries caused by a fall. Tell your doctor if you feel dizzy, or have vision changes or ringing in the ears.  · Some people get severe pain in the shoulder and have difficulty moving the arm where a shot was given. This happens very rarely.  · Any medication can cause a severe allergic reaction. Such reactions from a vaccine are very rare, estimated at fewer than 1 in a million doses, and would happen within a few minutes to a few hours after the vaccination.  · As with any medicine, there is a very remote chance of a vaccine  causing a serious injury or death.  · The safety of vaccines is always being monitored. For more information, visit: www.cdc.gov/vaccinesafety/    5. What if there is a serious problem?  What should I look for?  · Look for anything that concerns you, such as signs of a severe allergic reaction, very high fever, or unusual behavior.  · Signs of a severe allergic reaction can include hives, swelling of the face and throat, difficulty breathing, a fast heartbeat, dizziness, and weakness. These would usually start a few minutes to a few hours after the vaccination.  What should I do?  · If you think it is a severe allergic reaction or other emergency that can't wait, call 9-1-1 or get the person to the nearest hospital. Otherwise, call your doctor.  · Afterward, the reaction should be reported to the Vaccine Adverse Event Reporting System (VAERS). Your doctor might file this report, or you can do it yourself through the VAERS web site at www.vaers.[facility].gov, or by calling 1-245.805.8305.  · VAERS does not give medical advice.    6. The National Vaccine Injury Compensation Program  The National Vaccine Injury Compensation Program (VICP) is a federal program that was created to compensate people who may have been injured by certain vaccines.  Persons who believe they may have been injured by a vaccine can learn about the program and about filing a claim by calling 1-452.962.7298 or visiting the VICP website at www.hrsa.gov/vaccinecompensation. There is a time limit to file a claim for compensation.    7. How can I learn more?  · Ask your doctor. He or she can give you the vaccine package insert or suggest other sources of information.  · Call your local or state health department.  · Contact the Centers for Disease Control and Prevention (CDC):  ? Call 1-824.214.5200 (1-731-SJD-INFO) or  ? Visit CDC's website at www.cdc.gov/vaccines      Vaccine Information Statement Tdap Vaccine (2/24/2015)  This information is not  intended to replace advice given to you by your health care provider. Make sure you discuss any questions you have with your health care provider.  Document Released: 06/18/2013 Document Revised: 08/05/2019 Document Reviewed: 08/05/2019  Elsevier Interactive Patient Education © 2019 [employer]

## 2020-08-24 ENCOUNTER — LAB REQUISITION (OUTPATIENT)
Dept: LAB | Facility: HOSPITAL | Age: 30
End: 2020-08-24

## 2020-08-24 DIAGNOSIS — Z00.00 ROUTINE GENERAL MEDICAL EXAMINATION AT A HEALTH CARE FACILITY: ICD-10-CM

## 2020-08-24 PROCEDURE — 36415 COLL VENOUS BLD VENIPUNCTURE: CPT

## 2020-08-25 DIAGNOSIS — E55.9 VITAMIN D DEFICIENCY: Primary | ICD-10-CM

## 2020-08-25 LAB
25(OH)D3+25(OH)D2 SERPL-MCNC: 16.2 NG/ML (ref 30–100)
CHOLEST SERPL-MCNC: 163 MG/DL (ref 100–199)
HCV AB S/CO SERPL IA: <0.1 S/CO RATIO (ref 0–0.9)
HDLC SERPL-MCNC: 51 MG/DL
LDLC SERPL CALC-MCNC: 99 MG/DL (ref 0–99)
TRIGL SERPL-MCNC: 64 MG/DL (ref 0–149)
TSH SERPL DL<=0.005 MIU/L-ACNC: 1.5 UIU/ML (ref 0.45–4.5)
VLDLC SERPL CALC-MCNC: 13 MG/DL (ref 5–40)

## 2020-08-25 RX ORDER — LEVONORGESTREL AND ETHINYL ESTRADIOL 6-5-10
KIT ORAL
Qty: 28 TABLET | Refills: 0 | Status: SHIPPED | OUTPATIENT
Start: 2020-08-25 | End: 2020-08-26 | Stop reason: SDUPTHER

## 2020-08-25 RX ORDER — ERGOCALCIFEROL 1.25 MG/1
50000 CAPSULE ORAL WEEKLY
Qty: 4 CAPSULE | Refills: 2 | Status: SHIPPED | OUTPATIENT
Start: 2020-08-25 | End: 2020-11-16

## 2020-08-26 RX ORDER — LEVONORGESTREL AND ETHINYL ESTRADIOL 6-5-10
1 KIT ORAL DAILY
Qty: 28 TABLET | Refills: 0 | Status: SHIPPED | OUTPATIENT
Start: 2020-08-26 | End: 2020-10-23

## 2020-09-11 ENCOUNTER — OFFICE VISIT (OUTPATIENT)
Dept: OBSTETRICS AND GYNECOLOGY | Facility: CLINIC | Age: 30
End: 2020-09-11

## 2020-09-11 VITALS — RESPIRATION RATE: 14 BRPM | WEIGHT: 293 LBS | BODY MASS INDEX: 53.73 KG/M2

## 2020-09-11 DIAGNOSIS — Z30.014 ENCOUNTER FOR INITIAL PRESCRIPTION OF INTRAUTERINE CONTRACEPTIVE DEVICE (IUD): Primary | ICD-10-CM

## 2020-09-11 DIAGNOSIS — Z97.5 IUD (INTRAUTERINE DEVICE) IN PLACE: ICD-10-CM

## 2020-09-11 PROCEDURE — 58300 INSERT INTRAUTERINE DEVICE: CPT | Performed by: OBSTETRICS & GYNECOLOGY

## 2020-09-11 NOTE — PROGRESS NOTES
IUD Insertion    Patient's last menstrual period was 09/06/2020 (approximate).    Date of procedure:  9/11/2020    Risks and benefits discussed? yes  All questions answered? yes  Consents given by The patient  Written consent obtained? yes    Local anesthesia used:  no    Procedure documentation:    After verifying the patient had a low probability of being pregnant and met the criteria for insertion, a sterile speculum has placed and the cervix was cleansed with an antiseptic solution.  Vaginal discharge was scant.  The anterior lip of the cervix was grasped with a tenaculum and the uterine cavity was gently sounded. There was no difficulty passing the sound through the cervix.  Cervical dilation did not need to be performed prior to placing the IUD.  The uterus was anteverted and sounded to 8 cms.  The Mirena was then prepared per the manufacturers instructions.    The Mirena was advanced to a point 2 cms from the fundus and then the arms were released from the sheath.  The device was advanced to the fundus and the device was released fully from the sheath. The string was cut 2 cms in length.  Bleeding from the cervix was scant.    She tolerated the procedure without any difficulty.     Post procedure instructions: It was reviewed that the Mirena will not alter the timing of when she bleeds but it may decrease the quantity of flow and cramps.  Roughly 30% of people will be amenorrheic over time.  Efficacy rate of 99.2% over 5 years was discussed.  Spontaneous expulsion rate of 1-2% was also discussed.  If she has any issue with fever or excessive bleeding or pain she is to call the office immediately.  Otherwise I would like to see her back in 6 weeks with an ultrasound to confirm correct placement.    This note was electronically signed.    Jovon Guerra M.D.  September 11, 2020

## 2020-10-06 ENCOUNTER — TELEPHONE (OUTPATIENT)
Dept: OBSTETRICS AND GYNECOLOGY | Facility: CLINIC | Age: 30
End: 2020-10-06

## 2020-10-06 NOTE — TELEPHONE ENCOUNTER
Pt had a question about spotting and ask if this normal, I explained to Pt that her body is trying to adjust to the iud and as long as she not having really bad cramps and bleeding is light her symptoms are normal

## 2020-10-19 ENCOUNTER — TELEPHONE (OUTPATIENT)
Dept: OBSTETRICS AND GYNECOLOGY | Facility: CLINIC | Age: 30
End: 2020-10-19

## 2020-10-19 RX ORDER — POLYETHYLENE GLYCOL 3350 17 G/17G
17 POWDER, FOR SOLUTION ORAL DAILY
Qty: 507 G | Refills: 4 | Status: SHIPPED | OUTPATIENT
Start: 2020-10-19

## 2020-10-23 ENCOUNTER — OFFICE VISIT (OUTPATIENT)
Dept: OBSTETRICS AND GYNECOLOGY | Facility: CLINIC | Age: 30
End: 2020-10-23

## 2020-10-23 VITALS — WEIGHT: 293 LBS | RESPIRATION RATE: 14 BRPM | BODY MASS INDEX: 53.73 KG/M2

## 2020-10-23 DIAGNOSIS — Z30.431 CHECKING OF INTRAUTERINE DEVICE: Primary | ICD-10-CM

## 2020-10-23 PROCEDURE — 99213 OFFICE O/P EST LOW 20 MIN: CPT | Performed by: OBSTETRICS & GYNECOLOGY

## 2020-10-23 NOTE — PROGRESS NOTES
Subjective   Chief Complaint   Patient presents with   • Follow-up     Jud Cerrato is a 29 y.o. year old  presenting to be seen for follow-up of her recently placed Mirena.  Since the time of insertion she has been sexually active.  [place] has not been painful for her.   [place] has not been painful for her partner.    OTHER THINGS SHE WANTS TO DISCUSS TODAY:  Nothing else       Objective   Resp 14   Wt (!) 142 kg (313 lb)   Breastfeeding No   BMI 53.73 kg/m²     Imaging   Pelvic ultrasound images independantly reviewed - IUD is appropriately positioned with the arms open at the fundus and the anteverted uterus       Assessment   1. Normal position of Mirena     Plan   1. The importance of keeping all planned follow-up and taking all medications as prescribed was emphasized.  2. Follow up for annual exam           Total time spent today with Jud  was 20 minutes (level 3).  Greater than 50% of the time was spent coordinating care, answering her questions and counseling regarding pathophysiology of her presenting problem along with plans for any diagnositc work-up and treatment.    This note was electronically signed.    Jovon Guerra M.D.  2020    Note: Speech recognition transcription software may have been used to create portions of this document.  An attempt at proofreading has been made but errors in transcription could still be present.

## 2020-10-25 ENCOUNTER — RESULTS ENCOUNTER (OUTPATIENT)
Dept: OBSTETRICS AND GYNECOLOGY | Facility: CLINIC | Age: 30
End: 2020-10-25

## 2020-10-25 DIAGNOSIS — E55.9 VITAMIN D DEFICIENCY: ICD-10-CM

## 2020-11-09 PROCEDURE — U0003 INFECTIOUS AGENT DETECTION BY NUCLEIC ACID (DNA OR RNA); SEVERE ACUTE RESPIRATORY SYNDROME CORONAVIRUS 2 (SARS-COV-2) (CORONAVIRUS DISEASE [COVID-19]), AMPLIFIED PROBE TECHNIQUE, MAKING USE OF HIGH THROUGHPUT TECHNOLOGIES AS DESCRIBED BY CMS-2020-01-R: HCPCS | Performed by: NURSE PRACTITIONER

## 2020-11-16 RX ORDER — ERGOCALCIFEROL 1.25 MG/1
CAPSULE ORAL
Qty: 4 CAPSULE | Refills: 0 | Status: SHIPPED | OUTPATIENT
Start: 2020-11-16 | End: 2020-12-14

## 2020-12-14 RX ORDER — ERGOCALCIFEROL 1.25 MG/1
CAPSULE ORAL
Qty: 4 CAPSULE | Refills: 0 | Status: SHIPPED | OUTPATIENT
Start: 2020-12-14 | End: 2021-01-18

## 2021-01-18 RX ORDER — ERGOCALCIFEROL 1.25 MG/1
CAPSULE ORAL
Qty: 4 CAPSULE | Refills: 0 | Status: SHIPPED | OUTPATIENT
Start: 2021-01-18 | End: 2021-08-31

## 2021-04-30 ENCOUNTER — TELEPHONE (OUTPATIENT)
Dept: OBSTETRICS AND GYNECOLOGY | Facility: CLINIC | Age: 31
End: 2021-04-30

## 2021-04-30 NOTE — TELEPHONE ENCOUNTER
Pt states that she had the mirena IUD placed 9/11/20 and was having a period every month up until this month. Informed pt that this can happen with the IUD  but that she should take a home pregnancy test. Informed her to call the office if it is positive. Pt stated understanding

## 2021-08-31 ENCOUNTER — OFFICE VISIT (OUTPATIENT)
Dept: OBSTETRICS AND GYNECOLOGY | Facility: CLINIC | Age: 31
End: 2021-08-31

## 2021-08-31 VITALS
BODY MASS INDEX: 54.07 KG/M2 | RESPIRATION RATE: 14 BRPM | WEIGHT: 293 LBS | DIASTOLIC BLOOD PRESSURE: 72 MMHG | SYSTOLIC BLOOD PRESSURE: 128 MMHG

## 2021-08-31 DIAGNOSIS — Z01.419 WELL WOMAN EXAM: Primary | ICD-10-CM

## 2021-08-31 DIAGNOSIS — Z71.85 VACCINE COUNSELING: ICD-10-CM

## 2021-08-31 PROCEDURE — 99395 PREV VISIT EST AGE 18-39: CPT | Performed by: OBSTETRICS & GYNECOLOGY

## 2021-08-31 RX ORDER — IBUPROFEN 600 MG/1
600 TABLET ORAL 3 TIMES DAILY PRN
COMMUNITY
Start: 2021-08-20 | End: 2022-10-13

## 2021-08-31 RX ORDER — PHENTERMINE HYDROCHLORIDE 37.5 MG/1
TABLET ORAL
COMMUNITY
Start: 2021-08-26

## 2021-08-31 RX ORDER — HYDROXYZINE HYDROCHLORIDE 10 MG/1
10 TABLET, FILM COATED ORAL 3 TIMES DAILY PRN
COMMUNITY
Start: 2021-08-26 | End: 2022-10-13

## 2021-08-31 RX ORDER — FAMOTIDINE 20 MG/1
TABLET, FILM COATED ORAL
COMMUNITY
Start: 2021-08-19

## 2021-08-31 NOTE — PROGRESS NOTES
Subjective   Chief Complaint   Patient presents with   • Gynecologic Exam     Jud Cerrato is a 30 y.o. year old  presenting to be seen for her annual exam.     SEXUAL Hx:  She is currently sexually active.  In the past year there there has been NO new sexual partners.    Condoms are never used.  She would not like to be screened for STD's at today's exam.  Current birth control method: IUD - Mirena.  She is happy with her current method of contraception and does not want to discuss alternative methods of contraception.  MENSTRUAL Hx:  Patient's last menstrual period was 2021 (approximate).  In the past 6 months her cycles have been regular, predictable and occur monthly.  Her menstrual flow has been heavy some months and light other months.   Each month on average there are roughly 0 day(s) of very heavy flow.  Intermenstrual bleeding is absent.    Post-coital bleeding is absent.  Dysmenorrhea: is not affecting her activities of daily living  PMS: is not affecting her activities of daily living  Her cycles are not a source of concern for her that she wishes to discuss today.  HEALTH Hx:  She exercises regularly: no (and has no plans to become more active).  She wears her seat belt: yes.  She has concerns about domestic violence: no.  OTHER THINGS SHE WANTS TO DISCUSS TODAY:  Nothing else    The following portions of the patient's history were reviewed and updated as appropriate:problem list, current medications, allergies, past family history, past medical history, past social history and past surgical history.    Social History    Tobacco Use      Smoking status: Former Smoker        Types: Cigarettes        Start date:         Quit date: 3/1/2017        Years since quittin.5      Smokeless tobacco: Never Used    Review of Systems  Constitutional POS: nothing reported    NEG: anorexia or night sweats   Genitourinary POS: nothing reported    NEG: dysuria or hematuria      Gastointestinal POS:  constipation (chronic)    NEG: bloating, change in bowel habits, melena or reflux symptoms   Integument POS: nothing reported    NEG: moles that are changing in size, shape, color or rashes   Breast POS: nothing reported    NEG: persistent breast lump, skin dimpling or nipple discharge        Objective   /72   Resp 14   Wt (!) 143 kg (315 lb)   LMP 08/31/2021 (Approximate)   Breastfeeding No   BMI 54.07 kg/m²     General:  well developed; well nourished  no acute distress   Skin:  No suspicious lesions seen   Thyroid: normal to inspection and palpation   Breasts:  Not performed.   Abdomen: soft, non-tender; no masses  no umbilical or inguinal hernias are present  no hepato-splenomegaly   Pelvis: Exam limited by  body habitus  Clinical staff was present for exam  External genitalia:  normal appearance of the external genitalia including Bartholin's and [place]'s glands.  :  urethral meatus normal;  Vaginal:  normal pink mucosa without prolapse or lesions.  Cervix:  normal appearance. IUD string not seen;  Uterus:  normal size, shape and consistency.  Adnexa:  non palpable bilaterally.  Rectal:  digital rectal exam not performed; anus visually normal appearing.        Assessment   1. Normal GYN exam  2. Nonvisualization of IUD string and otherwise asymptomatic patient.  Prior ultrasound October 2020 with normal IUD placement  3. She is up to date on all relevant gynecologic and colorectal screenings     Plan   1. Pap was not done today.  I explained to Jud that the recommendations for Pap smear interval in a low risk patient has lengthened to 3 years time.  I told Jud she still needs to be seen in our office yearly for a full physical including breast and pelvic exam.  2. Reviewed with Jud that Keyana now has a 7-year indication  3. I discussed with Jud that she may be behind on needed vaccinations for COVID.  She may be able to obtain these vaccinations at her local pharmacy OR speak about  obtaining them with her primary care.  If she does obtain her vaccines, I have asked Jud to let us know the date each vaccine was obtained so that her medical record could be updated in our system.  4. The importance of keeping all planned follow-up and taking all medications as prescribed was emphasized.  5. Follow up for annual exam 1 year         This note was electronically signed.    Jovon Guerra M.D.  August 31, 2021    Note: Speech recognition transcription software may have been used to create portions of this document.  An attempt at proofreading has been made but errors in transcription could still be present.

## 2022-01-05 ENCOUNTER — TELEPHONE (OUTPATIENT)
Dept: OBSTETRICS AND GYNECOLOGY | Facility: CLINIC | Age: 32
End: 2022-01-05

## 2022-01-05 NOTE — TELEPHONE ENCOUNTER
Not uncommon did not have a cycle with the IUD.  Given that she had a normal ultrasound October 2020 demonstrating correct placement, I think it's highly probable the IUD is exactly where it should be.  If she is concerned we can get her in for an ultrasound but I don't think that is necessary

## 2022-01-05 NOTE — TELEPHONE ENCOUNTER
Mari pt having IUD issues, had cramping but hasn't had a period this month. She wants to make sure her IUD is still in place.

## 2022-10-12 ENCOUNTER — TELEPHONE (OUTPATIENT)
Dept: INTERNAL MEDICINE | Facility: CLINIC | Age: 32
End: 2022-10-12

## 2022-10-12 NOTE — TELEPHONE ENCOUNTER
"HUB TO READ     \" Left message for patient letting her know we have not received any records at this time.\"   "

## 2022-10-12 NOTE — TELEPHONE ENCOUNTER
PT CALLED TO VERIFY IF MEDICAL RECORDS HAVE BEEN RECEIVED FROM [facility].    PLEASE ADVISE.CALL BACK:[phone]

## 2022-10-12 NOTE — PROGRESS NOTES
Subjective   Chief Complaint   Patient presents with   • Gynecologic Exam     Jud Cerrato is a 31 y.o. year old  presenting to be seen for her annual exam.  Over the last 2 months she has had some intermittent pain with urination.  She thought she may have a UTI and increased her water intake.  Today she is not having symptoms    SEXUAL Hx:  She is currently sexually active.  In the past year there there has been NO new sexual partners.    Condoms are never used.  She would like to be screened for STD's at today's exam.  Current birth control method: IUD - Mirena.  She is happy with her current method of contraception and does not want to discuss alternative methods of contraception.  MENSTRUAL Hx:  No LMP recorded. Patient has had an implant.  In the past 6 months her cycles have been infrequent.  Her menstrual flow is typically light.   Each month on average there are roughly 0 day(s) of very heavy flow.  Intermenstrual bleeding is absent.    Post-coital bleeding is absent.  Dysmenorrhea: is not affecting her activities of daily living  PMS: is not affecting her activities of daily living  Her cycles are not a source of concern for her that she wishes to discuss today.  HEALTH Hx:  She exercises regularly: no (and has no plans to become more active).  She wears her seat belt: yes.  She has concerns about domestic violence: no.  OTHER THINGS SHE WANTS TO DISCUSS TODAY:  Nothing else    The following portions of the patient's history were reviewed and updated as appropriate:problem list, current medications, allergies, past family history, past medical history, past social history and past surgical history.    Social History    Tobacco Use      Smoking status: Former        Types: Cigarettes        Start date:         Quit date: 3/1/2017        Years since quittin.6      Smokeless tobacco: Never    Review of Systems  Constitutional POS: nothing reported    NEG: anorexia or night sweats    Genitourinary POS: nothing reported    NEG: dysuria or hematuria      Gastointestinal POS: nothing reported    NEG: bloating, change in bowel habits, melena or reflux symptoms   Integument POS: nothing reported    NEG: moles that are changing in size, shape, color or rashes   Breast POS: nothing reported    NEG: persistent breast lump, skin dimpling or nipple discharge        Objective   /72   Resp 14   Wt (!) 144 kg (317 lb)   BMI 54.41 kg/m²     General:  well developed; well nourished  no acute distress   Skin:  No suspicious lesions seen   Thyroid: normal to inspection and palpation   Breasts:  Not performed.   Abdomen: soft, non-tender; no masses  no umbilical or inguinal hernias are present  no hepato-splenomegaly   Pelvis: Clinical staff was present for exam  External genitalia:  normal appearance of the external genitalia including Bartholin's and [place]'s glands.  :  urethral meatus normal;  Vaginal:  normal pink mucosa without prolapse or lesions.  Cervix:  normal appearance. IUD string not seen;  Uterus:  normal size, shape and consistency.  Adnexa:  non palpable bilaterally.  Rectal:  digital rectal exam not performed; anus visually normal appearing.        Assessment   1. Normal GYN exam  2. Oligomenorrhea - most consistent with Mirena use  3. History of dysuria currently asymptomatic  4. She is up to date on all relevant gynecologic and colorectal screenings     Plan   1. Pap was not done today.  I explained to Jud that the recommendations for Pap smear interval in a low risk patient has lengthened to 3 years time.  I told Jud she still needs to be seen in our office yearly for a full physical including breast and pelvic exam.  2. The following tests were ordered today: STD swabs for GC, chlamydia and trichimoniasis.  It was explained to Jud that all lab test should be back within the one week after they are performed. She will be notified about the results, regardless of the  findings. If she has not been contacted by the office within 2 weeks after the test has been performed, it is her responsibility to contact us to learn about her results.  3. Reviewed with Jud that Keyana now has a 8-year indication  4. Her vaccine record was reviewed and updated.  5. I discussed with Jud that she may be behind on needed vaccinations for Influenza, TDAP and COVID.  She may be able to obtain these vaccinations at her local pharmacy OR speak about obtaining them with her primary care.  If she does obtain her vaccines, I have asked Jud to let us know the date each vaccine was obtained so that her medical record could be updated in our system.  6. The importance of keeping all planned follow-up and taking all medications as prescribed was emphasized.  7. Follow up for annual exam 1 year         This note was electronically signed.    Jovon Guerra M.D.  October 13, 2022    Part of this note may be an electronic transcription/translation of spoken language to printed text using the Dragon Dictation System.

## 2022-10-13 ENCOUNTER — OFFICE VISIT (OUTPATIENT)
Dept: OBSTETRICS AND GYNECOLOGY | Facility: CLINIC | Age: 32
End: 2022-10-13

## 2022-10-13 VITALS
RESPIRATION RATE: 14 BRPM | WEIGHT: 293 LBS | SYSTOLIC BLOOD PRESSURE: 120 MMHG | DIASTOLIC BLOOD PRESSURE: 72 MMHG | BODY MASS INDEX: 54.41 KG/M2

## 2022-10-13 DIAGNOSIS — Z11.3 SCREENING FOR VENEREAL DISEASE: ICD-10-CM

## 2022-10-13 DIAGNOSIS — Z30.431 CHECKING OF INTRAUTERINE DEVICE: ICD-10-CM

## 2022-10-13 DIAGNOSIS — Z01.419 WELL WOMAN EXAM: Primary | ICD-10-CM

## 2022-10-13 DIAGNOSIS — Z71.85 VACCINE COUNSELING: ICD-10-CM

## 2022-10-13 PROCEDURE — 99395 PREV VISIT EST AGE 18-39: CPT | Performed by: OBSTETRICS & GYNECOLOGY

## 2022-10-13 PROCEDURE — 2014F MENTAL STATUS ASSESS: CPT | Performed by: OBSTETRICS & GYNECOLOGY

## 2022-10-13 PROCEDURE — 3008F BODY MASS INDEX DOCD: CPT | Performed by: OBSTETRICS & GYNECOLOGY

## 2022-10-13 NOTE — PATIENT INSTRUCTIONS
Influenza (Flu) Vaccine (Inactivated or Recombinant): What You Need to Know      1. Why get vaccinated?  Influenza vaccine can prevent influenza (flu).  Flu is a contagious disease that spreads around the [place] every year, usually between October and May. Anyone can get the flu, but it is more dangerous for some people. Infants and young children, people 65 years of age and older, pregnant women, and people with certain health conditions or a weakened immune system are at greatest risk of flu complications.  Pneumonia, bronchitis, sinus infections and ear infections are examples of flu-related complications. If you have a medical condition, such as heart disease, cancer or diabetes, flu can make it worse.  Flu can cause fever and chills, sore throat, muscle aches, fatigue, cough, headache, and runny or stuffy nose. Some people may have vomiting and diarrhea, though this is more common in children than adults.  Each year thousands of people in the [place] die from flu, and many more are hospitalized. Flu vaccine prevents millions of illnesses and flu-related visits to the doctor each year.  2. Influenza vaccine  CDC recommends everyone 6 months of age and older get vaccinated every flu season. Children 6 months through 8 years of age may need 2 doses during a single flu season. Everyone else needs only 1 dose each flu season.  It takes about 2 weeks for protection to develop after vaccination.  There are many flu viruses, and they are always changing. Each year a new flu vaccine is made to protect against three or four viruses that are likely to cause disease in the upcoming flu season. Even when the vaccine doesn't exactly match these viruses, it may still provide some protection.  Influenza vaccine does not cause flu.  Influenza vaccine may be given at the same time as other vaccines.  3. Talk with your health care provider  Tell your vaccine provider if the person getting the vaccine:  Has had an  allergic reaction after a previous dose of influenza vaccine, or has any severe, life-threatening allergies.  Has ever had Guillain-Barré Syndrome (also called GBS).  In some cases, your health care provider may decide to postpone influenza vaccination to a future visit.  People with minor illnesses, such as a cold, may be vaccinated. People who are moderately or severely ill should usually wait until they recover before getting influenza vaccine.  Your health care provider can give you more information.  4. Risks of a vaccine reaction  Soreness, redness, and swelling where shot is given, fever, muscle aches, and headache can happen after influenza vaccine.  There may be a very small increased risk of Guillain-Barré Syndrome (GBS) after inactivated influenza vaccine (the flu shot).  Young children who get the flu shot along with pneumococcal vaccine (PCV13), and/or DTaP vaccine at the same time might be slightly more likely to have a seizure caused by fever. Tell your health care provider if a child who is getting flu vaccine has ever had a seizure.  People sometimes faint after medical procedures, including vaccination. Tell your provider if you feel dizzy or have vision changes or ringing in the ears.  As with any medicine, there is a very remote chance of a vaccine causing a severe allergic reaction, other serious injury, or death.  5. What if there is a serious problem?  An allergic reaction could occur after the vaccinated person leaves the clinic. If you see signs of a severe allergic reaction (hives, swelling of the face and throat, difficulty breathing, a fast heartbeat, dizziness, or weakness), call 9-1-1 and get the person to the nearest hospital.  For other signs that concern you, call your health care provider.  Adverse reactions should be reported to the Vaccine Adverse Event Reporting System (VAERS). Your health care provider will usually file this report, or you can do it yourself. Visit the VAERS  website at www.vaers.[facility].gov or call 1-903.179.1768.VAERS is only for reporting reactions, and VAERS staff do not give medical advice.  6. The National Vaccine Injury Compensation Program  The National Vaccine Injury Compensation Program (VICP) is a federal program that was created to compensate people who may have been injured by certain vaccines. Visit the VICP website at www.[facility].gov/vaccinecompensation or call 1-431.119.1352 to learn about the program and about filing a claim. There is a time limit to file a claim for compensation.  7. How can I learn more?  Ask your healthcare provider.  Call your local or state health department.  Contact the Centers for Disease Control and Prevention (CDC):  Call 1-654.885.7225 (9-163-UID-INFO) or  Visit CDC's www.cdc.gov/flu    Vaccine Information Statement (Interim) Inactivated Influenza Vaccine (8/15/2019)  This information is not intended to replace advice given to you by your health care provider. Make sure you discuss any questions you have with your health care provider.  Document Released: 10/12/2007 Document Revised: 2020 Document Reviewed: 2019  Elsevier Patient Education ©  [employer]          Tdap Vaccine (Tetanus, Diphtheria and Pertussis): What You Need to Know      1. Why get vaccinated?  Tetanus, diphtheria and pertussis are very serious diseases. Tdap vaccine can protect us from these diseases. And, Tdap vaccine given to pregnant women can protect  babies against pertussis..  TETANUS (Lockjaw) is rare in the [place] today. It causes painful muscle tightening and stiffness, usually all over the body.  It can lead to tightening of muscles in the head and neck so you can't open your mouth, swallow, or sometimes even breathe. Tetanus kills about 1 out of 10 people who are infected even after receiving the best medical care.  DIPHTHERIA is also rare in the [place] today. It can cause a thick coating to form in the back of the  throat.  It can lead to breathing problems, heart failure, paralysis, and death.  PERTUSSIS (Whooping Cough) causes severe coughing spells, which can cause difficulty breathing, vomiting and disturbed sleep.  It can also lead to weight loss, incontinence, and rib fractures. Up to 2 in 100 adolescents and 5 in 100 adults with pertussis are hospitalized or have complications, which could include pneumonia or death.    These diseases are caused by bacteria. Diphtheria and pertussis are spread from person to person through secretions from coughing or sneezing. Tetanus enters the body through cuts, scratches, or wounds.  Before vaccines, as many as 200,000 cases of diphtheria, 200,000 cases of pertussis, and hundreds of cases of tetanus, were reported in the [place] each year. Since vaccination began, reports of cases for tetanus and diphtheria have dropped by about 99% and for pertussis by about 80%.    2. Tdap vaccine  Tdap vaccine can protect adolescents and adults from tetanus, diphtheria, and pertussis. One dose of Tdap is routinely given at age 11 or 12. People who did not get Tdap at that age should get it as soon as possible.  Tdap is especially important for healthcare professionals and anyone having close contact with a baby younger than 12 months.  Pregnant women should get a dose of Tdap during every pregnancy, to protect the  from pertussis. Infants are most at risk for severe, life-threatening complications from pertussis.  Another vaccine, called Td, protects against tetanus and diphtheria, but not pertussis. A Td booster should be given every 10 years. Tdap may be given as one of these boosters if you have never gotten Tdap before. Tdap may also be given after a severe cut or burn to prevent tetanus infection.  Your doctor or the person giving you the vaccine can give you more information.  Tdap may safely be given at the same time as other vaccines.    3. Some people should not get this  vaccine  A person who has ever had a life-threatening allergic reaction after a previous dose of any diphtheria, tetanus or pertussis containing vaccine, OR has a severe allergy to any part of this vaccine, should not get Tdap vaccine. Tell the person giving the vaccine about any severe allergies.  Anyone who had coma or long repeated seizures within 7 days after a childhood dose of DTP or DTaP, or a previous dose of Tdap, should not get Tdap, unless a cause other than the vaccine was found. They can still get Td.  Talk to your doctor if you:  have seizures or another nervous system problem,  had severe pain or swelling after any vaccine containing diphtheria, tetanus or pertussis,  ever had a condition called Guillain-Barré Syndrome (GBS),  aren't feeling well on the day the shot is scheduled.    4. Risks  With any medicine, including vaccines, there is a chance of side effects. These are usually mild and go away on their own. Serious reactions are also possible but are rare.  Most people who get Tdap vaccine do not have any problems with it.  Mild problems following Tdap  (Did not interfere with activities)  Pain where the shot was given (about 3 in 4 adolescents or 2 in 3 adults)  Redness or swelling where the shot was given (about 1 person in 5)  Mild fever of at least 100.4°F (up to about 1 in 25 adolescents or 1 in 100 adults)  Headache (about 3 or 4 people in 10)  Tiredness (about 1 person in 3 or 4)  Nausea, vomiting, diarrhea, stomach ache (up to 1 in 4 adolescents or 1 in 10 adults)  Chills, sore joints (about 1 person in 10)  Body aches (about 1 person in 3 or 4)  Rash, swollen glands (uncommon)  Moderate problems following Tdap  (Interfered with activities, but did not require medical attention)  Pain where the shot was given (up to 1 in 5 or 6)  Redness or swelling where the shot was given (up to about 1 in 16 adolescents or 1 in 12 adults)  Fever over 102°F (about 1 in 100 adolescents or 1 in 250  adults)  Headache (about 1 in 7 adolescents or 1 in 10 adults)  Nausea, vomiting, diarrhea, stomach ache (up to 1 or 3 people in 100)  Swelling of the entire arm where the shot was given (up to about 1 in 500).  Severe problems following Tdap  (Unable to perform usual activities; required medical attention)  Swelling, severe pain, bleeding and redness in the arm where the shot was given (rare).  Problems that could happen after any vaccine:  People sometimes faint after a medical procedure, including vaccination. Sitting or lying down for about 15 minutes can help prevent fainting, and injuries caused by a fall. Tell your doctor if you feel dizzy, or have vision changes or ringing in the ears.  Some people get severe pain in the shoulder and have difficulty moving the arm where a shot was given. This happens very rarely.  Any medication can cause a severe allergic reaction. Such reactions from a vaccine are very rare, estimated at fewer than 1 in a million doses, and would happen within a few minutes to a few hours after the vaccination.  As with any medicine, there is a very remote chance of a vaccine causing a serious injury or death.  The safety of vaccines is always being monitored. For more information, visit: www.cdc.gov/vaccinesafety/    5. What if there is a serious problem?  What should I look for?  Look for anything that concerns you, such as signs of a severe allergic reaction, very high fever, or unusual behavior.  Signs of a severe allergic reaction can include hives, swelling of the face and throat, difficulty breathing, a fast heartbeat, dizziness, and weakness. These would usually start a few minutes to a few hours after the vaccination.  What should I do?  If you think it is a severe allergic reaction or other emergency that can't wait, call 9-1-1 or get the person to the nearest hospital. Otherwise, call your doctor.  Afterward, the reaction should be reported to the Vaccine Adverse Event Reporting  System (Biolase). Your doctor might file this report, or you can do it yourself through the Biolase web site at www.vaers.[facility].gov, or by calling 1-329.141.1386.  Biolase does not give medical advice.    6. The National Vaccine Injury Compensation Program  The National Vaccine Injury Compensation Program (VICP) is a federal program that was created to compensate people who may have been injured by certain vaccines.  Persons who believe they may have been injured by a vaccine can learn about the program and about filing a claim by calling 1-685.325.1434 or visiting the VICP website at www.[facility].gov/vaccinecompensation. There is a time limit to file a claim for compensation.    7. How can I learn more?  Ask your doctor. He or she can give you the vaccine package insert or suggest other sources of information.  Call your local or state health department.  Contact the Centers for Disease Control and Prevention (CDC):  Call 1-861.165.9053 (7-484-BEV-INFO) or  Visit CDC's website at www.cdc.gov/vaccines      Vaccine Information Statement Tdap Vaccine (2/24/2015)  This information is not intended to replace advice given to you by your health care provider. Make sure you discuss any questions you have with your health care provider.  Document Released: 06/18/2013 Document Revised: 08/05/2019 Document Reviewed: 08/05/2019  Elsevier Interactive Patient Education © 2019 [employer] Inc.

## 2022-10-19 ENCOUNTER — OFFICE VISIT (OUTPATIENT)
Dept: INTERNAL MEDICINE | Facility: CLINIC | Age: 32
End: 2022-10-19

## 2022-10-19 VITALS
DIASTOLIC BLOOD PRESSURE: 78 MMHG | HEART RATE: 68 BPM | OXYGEN SATURATION: 99 % | HEIGHT: 65 IN | SYSTOLIC BLOOD PRESSURE: 114 MMHG | BODY MASS INDEX: 48.82 KG/M2 | WEIGHT: 293 LBS

## 2022-10-19 DIAGNOSIS — M25.512 ACUTE PAIN OF LEFT SHOULDER: ICD-10-CM

## 2022-10-19 DIAGNOSIS — Z76.89 ENCOUNTER TO ESTABLISH CARE: Primary | ICD-10-CM

## 2022-10-19 PROCEDURE — 96372 THER/PROPH/DIAG INJ SC/IM: CPT | Performed by: NURSE PRACTITIONER

## 2022-10-19 PROCEDURE — 99213 OFFICE O/P EST LOW 20 MIN: CPT | Performed by: NURSE PRACTITIONER

## 2022-10-19 RX ORDER — KETOROLAC TROMETHAMINE 30 MG/ML
30 INJECTION, SOLUTION INTRAMUSCULAR; INTRAVENOUS ONCE
Status: COMPLETED | OUTPATIENT
Start: 2022-10-19 | End: 2022-10-19

## 2022-10-19 RX ORDER — IBUPROFEN 600 MG/1
600 TABLET ORAL EVERY 6 HOURS PRN
Qty: 30 TABLET | Refills: 1 | Status: SHIPPED | OUTPATIENT
Start: 2022-10-19

## 2022-10-19 RX ORDER — IBUPROFEN 600 MG/1
600 TABLET ORAL EVERY 6 HOURS PRN
COMMUNITY
End: 2022-10-19 | Stop reason: SDUPTHER

## 2022-10-19 RX ADMIN — KETOROLAC TROMETHAMINE 30 MG: 30 INJECTION, SOLUTION INTRAMUSCULAR; INTRAVENOUS at 18:03
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
## 2022-10-20 ENCOUNTER — TELEPHONE (OUTPATIENT)
Dept: INTERNAL MEDICINE | Facility: CLINIC | Age: 32
End: 2022-10-20

## 2022-10-20 NOTE — TELEPHONE ENCOUNTER
Request for previous records on patient faxed to [facility].   Fax # [phone]

## 2022-10-26 ENCOUNTER — HOSPITAL ENCOUNTER (OUTPATIENT)
Dept: GENERAL RADIOLOGY | Facility: HOSPITAL | Age: 32
Discharge: HOME | End: 2022-10-26
Admitting: NURSE PRACTITIONER

## 2022-10-26 ENCOUNTER — TELEPHONE (OUTPATIENT)
Dept: INTERNAL MEDICINE | Facility: CLINIC | Age: 32
End: 2022-10-26

## 2022-10-26 DIAGNOSIS — M25.512 ACUTE PAIN OF LEFT SHOULDER: ICD-10-CM

## 2022-10-26 PROCEDURE — 73030 X-RAY EXAM OF SHOULDER: CPT

## 2022-10-26 NOTE — TELEPHONE ENCOUNTER
Caller: Jud Cerrato    Relationship: Self    Best call back number: [phone]  What is the best time to reach you: ANY     Who are you requesting to speak with (clinical staff, provider,  specific staff member): CLINICAL     What was the call regarding: WAS TOLD TO LET US KNOW WHEN SHE GOT THE XRAY DONE REQUESTED BY RYANNE REBOLLEDO. SHE GOT THIS TODAY 10.26.22 AT [facility]. NEEDS TO SCHEDULE APPOINTMENT ALSO.     Do you require a callback: NO

## 2022-10-27 DIAGNOSIS — M25.512 ACUTE PAIN OF LEFT SHOULDER: Primary | ICD-10-CM

## 2022-10-28 RX ORDER — TINIDAZOLE 500 MG/1
2 TABLET ORAL ONCE
Qty: 4 TABLET | Refills: 0 | Status: SHIPPED | OUTPATIENT
Start: 2022-10-28 | End: 2022-10-28

## 2022-12-04 ENCOUNTER — APPOINTMENT (OUTPATIENT)
Dept: MRI IMAGING | Facility: HOSPITAL | Age: 32
End: 2022-12-04

## 2023-02-22 ENCOUNTER — OFFICE VISIT (OUTPATIENT)
Dept: INTERNAL MEDICINE | Facility: CLINIC | Age: 33
End: 2023-02-22
Payer: MEDICAID

## 2023-02-22 ENCOUNTER — LAB (OUTPATIENT)
Dept: LAB | Facility: HOSPITAL | Age: 33
End: 2023-02-22
Payer: MEDICAID

## 2023-02-22 VITALS
OXYGEN SATURATION: 99 % | TEMPERATURE: 97.5 F | HEART RATE: 65 BPM | WEIGHT: 293 LBS | BODY MASS INDEX: 48.82 KG/M2 | SYSTOLIC BLOOD PRESSURE: 124 MMHG | DIASTOLIC BLOOD PRESSURE: 80 MMHG | RESPIRATION RATE: 20 BRPM | HEIGHT: 65 IN

## 2023-02-22 DIAGNOSIS — N63.0 SUBCUTANEOUS NODULE OF BREAST: ICD-10-CM

## 2023-02-22 DIAGNOSIS — Z13.29 SCREENING FOR HYPOTHYROIDISM: ICD-10-CM

## 2023-02-22 DIAGNOSIS — Z09 FOLLOW-UP EXAM: Primary | ICD-10-CM

## 2023-02-22 DIAGNOSIS — Z13.0 SCREENING FOR DEFICIENCY ANEMIA: ICD-10-CM

## 2023-02-22 DIAGNOSIS — Z13.220 SCREENING FOR HYPERLIPIDEMIA: ICD-10-CM

## 2023-02-22 DIAGNOSIS — M25.512 CHRONIC LEFT SHOULDER PAIN: ICD-10-CM

## 2023-02-22 DIAGNOSIS — Z13.1 SCREENING FOR DIABETES MELLITUS: ICD-10-CM

## 2023-02-22 DIAGNOSIS — G89.29 CHRONIC LEFT SHOULDER PAIN: ICD-10-CM

## 2023-02-22 LAB
ALBUMIN SERPL-MCNC: 3.9 G/DL (ref 3.5–5.2)
ALBUMIN/GLOB SERPL: 1.2 G/DL
ALP SERPL-CCNC: 65 U/L (ref 39–117)
ALT SERPL W P-5'-P-CCNC: 12 U/L (ref 1–33)
ANION GAP SERPL CALCULATED.3IONS-SCNC: 5.5 MMOL/L (ref 5–15)
AST SERPL-CCNC: 13 U/L (ref 1–32)
BILIRUB SERPL-MCNC: 0.3 MG/DL (ref 0–1.2)
BUN SERPL-MCNC: 9 MG/DL (ref 6–20)
BUN/CREAT SERPL: 13 (ref 7–25)
CALCIUM SPEC-SCNC: 9.1 MG/DL (ref 8.6–10.5)
CHLORIDE SERPL-SCNC: 109 MMOL/L (ref 98–107)
CHOLEST SERPL-MCNC: 181 MG/DL (ref 0–200)
CO2 SERPL-SCNC: 23.5 MMOL/L (ref 22–29)
CREAT SERPL-MCNC: 0.69 MG/DL (ref 0.57–1)
DEPRECATED RDW RBC AUTO: 44.9 FL (ref 37–54)
EGFRCR SERPLBLD CKD-EPI 2021: 119.9 ML/MIN/1.73
ERYTHROCYTE [DISTWIDTH] IN BLOOD BY AUTOMATED COUNT: 14.3 % (ref 12.3–15.4)
GLOBULIN UR ELPH-MCNC: 3.3 GM/DL
GLUCOSE SERPL-MCNC: 83 MG/DL (ref 65–99)
HBA1C MFR BLD: 5.7 % (ref 4.8–5.6)
HCT VFR BLD AUTO: 37.8 % (ref 34–46.6)
HDLC SERPL-MCNC: 50 MG/DL (ref 40–60)
HGB BLD-MCNC: 12.5 G/DL (ref 12–15.9)
LDLC SERPL CALC-MCNC: 122 MG/DL (ref 0–100)
LDLC/HDLC SERPL: 2.43 {RATIO}
MCH RBC QN AUTO: 28.3 PG (ref 26.6–33)
MCHC RBC AUTO-ENTMCNC: 33.1 G/DL (ref 31.5–35.7)
MCV RBC AUTO: 85.5 FL (ref 79–97)
PLATELET # BLD AUTO: 306 10*3/MM3 (ref 140–450)
PMV BLD AUTO: 11.7 FL (ref 6–12)
POTASSIUM SERPL-SCNC: 4.3 MMOL/L (ref 3.5–5.2)
PROT SERPL-MCNC: 7.2 G/DL (ref 6–8.5)
RBC # BLD AUTO: 4.42 10*6/MM3 (ref 3.77–5.28)
SODIUM SERPL-SCNC: 138 MMOL/L (ref 136–145)
TRIGL SERPL-MCNC: 47 MG/DL (ref 0–150)
TSH SERPL DL<=0.05 MIU/L-ACNC: 1 UIU/ML (ref 0.27–4.2)
VLDLC SERPL-MCNC: 9 MG/DL (ref 5–40)
WBC NRBC COR # BLD: 7.55 10*3/MM3 (ref 3.4–10.8)

## 2023-02-22 PROCEDURE — 80053 COMPREHEN METABOLIC PANEL: CPT | Performed by: NURSE PRACTITIONER

## 2023-02-22 PROCEDURE — 96372 THER/PROPH/DIAG INJ SC/IM: CPT | Performed by: NURSE PRACTITIONER

## 2023-02-22 PROCEDURE — 99214 OFFICE O/P EST MOD 30 MIN: CPT | Performed by: NURSE PRACTITIONER

## 2023-02-22 PROCEDURE — 83036 HEMOGLOBIN GLYCOSYLATED A1C: CPT | Performed by: NURSE PRACTITIONER

## 2023-02-22 PROCEDURE — 85027 COMPLETE CBC AUTOMATED: CPT | Performed by: NURSE PRACTITIONER

## 2023-02-22 PROCEDURE — 80061 LIPID PANEL: CPT | Performed by: NURSE PRACTITIONER

## 2023-02-22 PROCEDURE — 84443 ASSAY THYROID STIM HORMONE: CPT | Performed by: NURSE PRACTITIONER

## 2023-02-22 RX ORDER — OSELTAMIVIR PHOSPHATE 75 MG/1
1 CAPSULE ORAL EVERY 12 HOURS SCHEDULED
COMMUNITY
Start: 2022-12-10 | End: 2023-02-22

## 2023-02-22 RX ORDER — ONDANSETRON 4 MG/1
4 TABLET, ORALLY DISINTEGRATING ORAL EVERY 8 HOURS PRN
COMMUNITY
Start: 2022-12-10 | End: 2023-02-22

## 2023-02-22 RX ORDER — KETOROLAC TROMETHAMINE 30 MG/ML
60 INJECTION, SOLUTION INTRAMUSCULAR; INTRAVENOUS ONCE
Status: COMPLETED | OUTPATIENT
Start: 2023-02-22 | End: 2023-02-22

## 2023-02-22 RX ORDER — ACETAMINOPHEN 160 MG
2 TABLET,DISINTEGRATING ORAL DAILY
COMMUNITY
Start: 2023-01-24

## 2023-02-22 RX ORDER — PIMECROLIMUS 1 %
CREAM (GRAM) TOPICAL
COMMUNITY
Start: 2023-01-27

## 2023-02-22 RX ADMIN — KETOROLAC TROMETHAMINE 60 MG: 30 INJECTION, SOLUTION INTRAMUSCULAR; INTRAVENOUS at 09:53

## 2023-03-01 ENCOUNTER — TELEPHONE (OUTPATIENT)
Dept: INTERNAL MEDICINE | Facility: CLINIC | Age: 33
End: 2023-03-01
Payer: MEDICAID

## 2023-03-01 NOTE — TELEPHONE ENCOUNTER
Caller: Jud Cerrato    Relationship: Self    Best call back number: [phone]    What orders are you requesting (i.e. lab or imaging): MAMMOGRAM AND ULTRASOUND OF LEFT BREAST     In what timeframe would the patient need to come in: AS SOON AS POSSIBLE     Where will you receive your lab/imaging services: [address]     Additional notes: PATIENT IS NEEDING TO GET ORDERS PUT IN FOR A MAMMOGRAM AND ULTRASOUND SO SHE CAN GO GET THIS DONE.

## 2023-03-02 DIAGNOSIS — N63.0 BREAST NODULE: Primary | ICD-10-CM

## 2023-03-02 NOTE — TELEPHONE ENCOUNTER
Left message for patient that orders have been placed but will follow up on status and let her know.

## 2023-03-02 NOTE — PROGRESS NOTES
"    Office Note     Name: Jud Cerrato    : 1990     MRN: [id number]     Chief Complaint  Shoulder Pain and Cyst (L breast lump)    Subjective     History of Present Illness:  Jud Cerrato is a 32 y.o. female who presents today for a follow-up on left shoulder pain and for a new complaint of a cyst to her left breast.  Patient had an x-ray of her left shoulder on 2022 which did not show any acute osseous abnormality or significant degenerative change.  An MRI was then ordered to further evaluate her shoulder pain.  Insurance denied her MRI.  She reports her shoulder discomfort was temporarily improved at the previous visit after an IM Toradol injection.  The discomfort has since returned and is ongoing, especially with certain movements.  She feels as though \"the bone is cracked\".  She does have decreased range of motion to her left upper extremity.  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  She also noted a cyst to her left breast.  She denies cyst is being painful.  She felt that there was a cyst under her right arm as well.  She denies further complaints or concerns at this time.    Review of Systems   Musculoskeletal:        Left shoulder pain     Skin:        Cyst to left breast and right axilla       Past Medical History:   Diagnosis Date   • Anxiety     Off meds ~ 2018   • Chronic constipation    • Depression    • Gastritis    • GERD (gastroesophageal reflux disease)    • H/O: depression    • Headache    • History of kidney stones    • Obesity    • PTSD (post-traumatic stress disorder) - from uncle's death        Past Surgical History:   Procedure Laterality Date   • COLONOSCOPY     • ENDOSCOPY     • WISDOM TOOTH EXTRACTION  2015       Social History     Socioeconomic History   • Marital status: Single   Tobacco Use   • Smoking status: Former     Packs/day: 0.00     Types: Cigarettes     Start date: " "2012     Quit date: 3/1/2017     Years since quittin.0   • Smokeless tobacco: Never   Substance and Sexual Activity   • Alcohol use: No   • Drug use: Never   • Sexual activity: Yes     Partners: Male     Birth control/protection: I.U.D.         Current Outpatient Medications:   •  Cholecalciferol (Vitamin D3) 50 MCG (2000 UT) capsule, Take 2 capsules by mouth Daily., Disp: , Rfl:   •  docusate sodium (COLACE) 50 MG capsule, Take  by mouth 2 (Two) Times a Day., Disp: , Rfl:   •  Elidel 1 % cream, APPLY EXTERNALLY TO AFFECTED AREA TWICE DAILY, Disp: , Rfl:   •  famotidine (PEPCID) 20 MG tablet, TAKE 1 TABLET BY MOUTH TWICE DAILY AS NEEDED FOR GERD, Disp: , Rfl:   •  ibuprofen (ADVIL,MOTRIN) 600 MG tablet, Take 1 tablet by mouth Every 6 (Six) Hours As Needed for Mild Pain., Disp: 30 tablet, Rfl: 1  •  linaCLOtide (LINZESS PO), Take  by mouth., Disp: , Rfl:   •  phentermine (ADIPEX-P) 37.5 MG tablet, , Disp: , Rfl:   •  polyethylene glycol (MiraLax) 17 GM/SCOOP powder, Take 17 g by mouth Daily. Dissolve in large glass of water and take daily, Disp: 507 g, Rfl: 4    Objective     Vital Signs  /80   Pulse 65   Temp 97.5 °F (36.4 °C)   Resp 20   Ht 165.1 cm (65\")   Wt (!) 140 kg (308 lb)   SpO2 99%   BMI 51.25 kg/m²   Estimated body mass index is 51.25 kg/m² as calculated from the following:    Height as of this encounter: 165.1 cm (65\").    Weight as of this encounter: 140 kg (308 lb).    Class 3 Severe Obesity (BMI >=40). Obesity-related health conditions include the following: Not addressed at this visit. Obesity is Not addressed at this visit. BMI is Not addressed at this visit. We discussed Not addressed at this visit.      Physical Exam  Constitutional:       Appearance: Normal appearance.   HENT:      Head: Normocephalic and atraumatic.      Nose: Nose normal.   Eyes:      Extraocular Movements: Extraocular movements intact.      Conjunctiva/sclera: Conjunctivae normal.      Pupils: Pupils are " equal, round, and reactive to light.   Cardiovascular:      Rate and Rhythm: Normal rate and regular rhythm.   Pulmonary:      Effort: Pulmonary effort is normal.   Musculoskeletal:      Cervical back: Neck supple.      Comments: Decreased range of motion to left upper extremity   Skin:     General: Skin is warm and dry.      Comments: Ennis sized nodule noted to the left outer breast at 9:00, no nodule palpated to right axilla   Neurological:      General: No focal deficit present.      Mental Status: She is alert and oriented to person, place, and time. Mental status is at baseline.   Psychiatric:         Mood and Affect: Mood normal.         Behavior: Behavior normal.         Thought Content: Thought content normal.         Judgment: Judgment normal.          Assessment and Plan     Diagnoses and all orders for this visit:    1. Follow-up exam (Primary)    2. Chronic left shoulder pain  -     Ambulatory Referral to Physical Therapy  -     ketorolac (TORADOL) injection 60 mg    3. Subcutaneous nodule of breast  -     US breast left complete; Future    4. Screening for diabetes mellitus  -     Comprehensive Metabolic Panel  -     Hemoglobin A1c    5. Screening for hyperlipidemia  -     Lipid Panel    6. Screening for hypothyroidism  -     TSH    7. Screening for deficiency anemia  -     CBC (No Diff)    Plan:  Will give IM Toradol 60 mg x 1 in the office today.  Will refer to physical therapy for further evaluation and treatment of left shoulder pain.  Unable to get MRI approved through insurance without physical therapy first.  Will order a diagnostic mammogram and ultrasound of the left breast to further evaluate left breast nodule.  Will also placed order for fasting labs.  Will review labs and diagnostics with patient at follow-up appointment.  Return to clinic in 1 month.  Return to clinic sooner if needed.    Follow Up  Return in about 1 month (around 3/22/2023) for Recheck.    RYANNE Graham    Part of  this note may be an electronic transcription/translation of spoken language to printed text using the Dragon Dictation System.

## 2023-03-06 ENCOUNTER — TELEPHONE (OUTPATIENT)
Dept: INTERNAL MEDICINE | Facility: CLINIC | Age: 33
End: 2023-03-06
Payer: MEDICAID

## 2023-03-06 NOTE — TELEPHONE ENCOUNTER
Pt notified the orders for the mammogram and ultrasound have been placed someone will call to make appt. Verbalized understanding.

## 2023-03-23 ENCOUNTER — HOSPITAL ENCOUNTER (OUTPATIENT)
Dept: PHYSICAL THERAPY | Facility: HOSPITAL | Age: 33
Setting detail: THERAPIES SERIES
Discharge: HOME | End: 2023-03-23
Payer: MEDICAID

## 2023-03-23 DIAGNOSIS — G89.29 CHRONIC LEFT SHOULDER PAIN: Primary | ICD-10-CM

## 2023-03-23 DIAGNOSIS — M25.512 CHRONIC LEFT SHOULDER PAIN: Primary | ICD-10-CM

## 2023-03-23 PROCEDURE — 97161 PT EVAL LOW COMPLEX 20 MIN: CPT
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
## 2023-03-31 ENCOUNTER — HOSPITAL ENCOUNTER (OUTPATIENT)
Dept: PHYSICAL THERAPY | Facility: HOSPITAL | Age: 33
Setting detail: THERAPIES SERIES
Discharge: HOME | End: 2023-03-31
Payer: MEDICAID

## 2023-03-31 DIAGNOSIS — M25.512 CHRONIC LEFT SHOULDER PAIN: Primary | ICD-10-CM

## 2023-03-31 DIAGNOSIS — G89.29 CHRONIC LEFT SHOULDER PAIN: Primary | ICD-10-CM

## 2023-03-31 PROCEDURE — 97110 THERAPEUTIC EXERCISES: CPT

## 2023-03-31 PROCEDURE — 97140 MANUAL THERAPY 1/> REGIONS: CPT

## 2023-03-31 NOTE — THERAPY TREATMENT NOTE
Outpatient Physical Therapy Ortho Treatment Note   [facility]     Patient Name: Jud Cerrato  : 1990  MRN: [id number]  Today's Date: 3/31/2023      Visit Date: 2023    Visit Dx:    ICD-10-CM ICD-9-CM   1. Chronic left shoulder pain  M25.512 719.41    G89.29 338.29       Patient Active Problem List   Diagnosis   • Annual GYN exam w/o problems   • Vitamin D deficiency   • Mirena        Past Medical History:   Diagnosis Date   • Anxiety     Off meds ~    • Chronic constipation    • Depression    • Gastritis    • GERD (gastroesophageal reflux disease)    • H/O: depression    • Headache    • History of kidney stones    • Obesity    • PTSD (post-traumatic stress disorder) - from uncle's death         Past Surgical History:   Procedure Laterality Date   • COLONOSCOPY     • ENDOSCOPY     • WISDOM TOOTH EXTRACTION  2015                        PT Assessment/Plan     Row Name 23 0820          PT Assessment    Assessment Comments Patient having tenderness throughout proximal biceps region and supraspinatus as well as AC joint.  She is showing adherence to her home exercise program without complaints.  Patient was able to progress with strengthening in clinic and all exercises were tolerated well and improved pain with elbows tucked versus up high during rows.  -        PT Plan    PT Plan Comments Continue per plan of care  -           User Key  (r) = Recorded By, (t) = Taken By, (c) = Cosigned By    Initials Name Provider Type    Won Olsen, PT Physical Therapist                   OP Exercises     Row Name 23 0831             Subjective Comments    Subjective Comments Patient reports performing her exercises as prescribed..  Doing well.  -         Subjective Pain    Able to rate subjective pain? yes  -      Pre-Treatment Pain Level 2  -      Post-Treatment Pain Level 2  -         Total Minutes    75202 - PT Therapeutic Exercise Minutes 28   -      71782 - PT Manual Therapy Minutes 11  -         Exercise 1    Exercise Name 1 UB E 3 minutes level 4  -         Exercise 2    Exercise Name 2 Machine rows 2 x 15 2 plates  -         Exercise 3    Exercise Name 3 Singlearm Row on cable column X15 4 plates  -         Exercise 4    Exercise Name 4 Rubbery yellow Thera-Band 20  -         Exercise 5    Exercise Name 5 Yellow Thera-Band bicep curls 3 tachycardia eccentric X10  -         Exercise 6    Exercise Name 6 Yellow Thera-Band placed single-leg bicep curls 3 seconds eccentric's  -         Exercise 7    Exercise Name 7 Yellow Thera-Band scaption 5 x 5-second isometrics  -            User Key  (r) = Recorded By, (t) = Taken By, (c) = Cosigned By    Initials Name Provider Type    Won Olsen PT Physical Therapist                         Manual Rx (last 36 hours)     Manual Treatments     Row Name 03/31/23 0820             Total Minutes    42930 - PT Manual Therapy Minutes 11  -         Manual Rx 1    Manual Rx 1 Location Left shoulder  -      Manual Rx 1 Type Soft tissue mobilization with use of tools at left biceps and anterior shoulder musculature.  Gentle GH joint mobilizations all directions.  -      Manual Rx 1 Duration 11 minutes  -            User Key  (r) = Recorded By, (t) = Taken By, (c) = Cosigned By    Initials Name Provider Type    Won Olsen PT Physical Therapist                                   Time Calculation:   Start Time: 0820  Timed Charges  29711 - PT Therapeutic Exercise Minutes: 28  85960 - PT Manual Therapy Minutes: 11  Total Minutes  Timed Charges Total Minutes: 39   Total Minutes: 39  Therapy Charges for Today     Code Description Service Date Service Provider Modifiers Qty    59682565599  PT THER PROC EA 15 MIN 3/31/2023 Won Willoughby PT GP 2    [id number]  PT MANUAL THERAPY EA 15 MIN 3/31/2023 Won Willoughby PT GP 1                    Won Willoughby PT  3/31/2023

## 2023-04-04 ENCOUNTER — HOSPITAL ENCOUNTER (OUTPATIENT)
Dept: PHYSICAL THERAPY | Facility: HOSPITAL | Age: 33
Setting detail: THERAPIES SERIES
Discharge: HOME | End: 2023-04-04
Payer: MEDICAID

## 2023-04-04 DIAGNOSIS — G89.29 CHRONIC LEFT SHOULDER PAIN: Primary | ICD-10-CM

## 2023-04-04 DIAGNOSIS — M25.512 CHRONIC LEFT SHOULDER PAIN: Primary | ICD-10-CM

## 2023-04-04 PROCEDURE — 97110 THERAPEUTIC EXERCISES: CPT

## 2023-04-04 NOTE — THERAPY TREATMENT NOTE
Outpatient Physical Therapy Ortho Treatment Note   [facility]     Patient Name: Jud Cerrato  : 1990  MRN: [id number]  Today's Date: 2023      Visit Date: 2023    Visit Dx:    ICD-10-CM ICD-9-CM   1. Chronic left shoulder pain  M25.512 719.41    G89.29 338.29       Patient Active Problem List   Diagnosis   • Annual GYN exam w/o problems   • Vitamin D deficiency   • Mirena        Past Medical History:   Diagnosis Date   • Anxiety     Off meds ~ 2018   • Chronic constipation    • Depression    • Gastritis    • GERD (gastroesophageal reflux disease)    • H/O: depression    • Headache    • History of kidney stones    • Obesity    • PTSD (post-traumatic stress disorder) - from uncle's death         Past Surgical History:   Procedure Laterality Date   • COLONOSCOPY     • ENDOSCOPY     • WISDOM TOOTH EXTRACTION  2015                        PT Assessment/Plan     Row Name 23 0815          PT Assessment    Assessment Comments Trialed K tape at the left shoulder prior to TherEX.  Progressed to red Thera-Band and also progressed to closed chain exercises without increased pain.  HEP was updated to include more resistive active range of motion exercises all planes with red Thera-Band.  We will continue to progress load as tolerable.  -        PT Plan    PT Plan Comments Continue per plan of care  -           User Key  (r) = Recorded By, (t) = Taken By, (c) = Cosigned By    Initials Name Provider Type    Won Olsen, PT Physical Therapist                   OP Exercises     Row Name 23 0815             Subjective Comments    Subjective Comments Patient reports that her shoulder is doing well at this time and understands how to perform home exercise program.  -         Subjective Pain    Able to rate subjective pain? yes  -      Pre-Treatment Pain Level 2  -      Post-Treatment Pain Level 1  -         Total Minutes    64183 - PT  Therapeutic Exercise Minutes 42  -         Exercise 1    Exercise Name 1 UB 5 minutes  -         Exercise 2    Exercise Name 2 Time spent kinesiotaping left shoulder.  3 strep technique used.  1Y strip superior-inferior deltoid.  1 strip over AC joint from anterior to posterior deltoid.  1 strip horizontal anterior posterior deltoid.  5 minutes  -         Exercise 3    Exercise Name 3 Machine rows 4 x 15 3 plates  -         Exercise 4    Exercise Name 4 Red Thera-Band shoulder extension, shoulder external rotation, 90 degrees external rotation, abduction, flexion, PNF.  10 reps all  -         Exercise 5    Exercise Name 5 2 x 10 5 pound upside down kettle bell press  -         Exercise 6    Exercise Name 6 Modified table push-ups 10 reps  -         Exercise 7    Exercise Name 7 Modified table push-ups shoulder taps 10 reps  -            User Key  (r) = Recorded By, (t) = Taken By, (c) = Cosigned By    Initials Name Provider Type    Won Olsen, PT Physical Therapist                                                Time Calculation:   Start Time: 0815  Timed Charges  94805 - PT Therapeutic Exercise Minutes: 42  Total Minutes  Timed Charges Total Minutes: 42   Total Minutes: 42  Therapy Charges for Today     Code Description Service Date Service Provider Modifiers Qty    65033965877  PT THER PROC EA 15 MIN 4/4/2023 Won Willoughby, PT GP 3                    Won Willoughby PT  4/4/2023

## 2023-04-11 ENCOUNTER — HOSPITAL ENCOUNTER (OUTPATIENT)
Dept: MAMMOGRAPHY | Facility: HOSPITAL | Age: 33
Discharge: HOME | End: 2023-04-11
Payer: MEDICAID

## 2023-04-11 ENCOUNTER — HOSPITAL ENCOUNTER (OUTPATIENT)
Dept: ULTRASOUND IMAGING | Facility: HOSPITAL | Age: 33
Discharge: HOME | End: 2023-04-11
Payer: MEDICAID

## 2023-04-11 DIAGNOSIS — N63.0 SUBCUTANEOUS NODULE OF BREAST: ICD-10-CM

## 2023-04-11 DIAGNOSIS — N63.0 BREAST NODULE: ICD-10-CM

## 2023-04-11 PROCEDURE — 77066 DX MAMMO INCL CAD BI: CPT

## 2023-04-11 PROCEDURE — 76642 ULTRASOUND BREAST LIMITED: CPT

## 2023-04-11 PROCEDURE — 77062 BREAST TOMOSYNTHESIS BI: CPT | Performed by: RADIOLOGY

## 2023-04-11 PROCEDURE — G0279 TOMOSYNTHESIS, MAMMO: HCPCS

## 2023-04-11 PROCEDURE — 77066 DX MAMMO INCL CAD BI: CPT | Performed by: RADIOLOGY

## 2023-04-11 PROCEDURE — 76642 ULTRASOUND BREAST LIMITED: CPT | Performed by: RADIOLOGY

## 2023-04-15 ENCOUNTER — OFFICE VISIT (OUTPATIENT)
Dept: INTERNAL MEDICINE | Facility: CLINIC | Age: 33
End: 2023-04-15
Payer: MEDICAID

## 2023-04-15 VITALS
BODY MASS INDEX: 48.82 KG/M2 | HEART RATE: 60 BPM | HEIGHT: 65 IN | SYSTOLIC BLOOD PRESSURE: 132 MMHG | DIASTOLIC BLOOD PRESSURE: 84 MMHG | WEIGHT: 293 LBS | TEMPERATURE: 97.1 F | OXYGEN SATURATION: 99 %

## 2023-04-15 DIAGNOSIS — Z09 FOLLOW-UP EXAM: Primary | ICD-10-CM

## 2023-04-15 DIAGNOSIS — G89.29 CHRONIC LEFT SHOULDER PAIN: ICD-10-CM

## 2023-04-15 DIAGNOSIS — M25.512 CHRONIC LEFT SHOULDER PAIN: ICD-10-CM

## 2023-04-15 DIAGNOSIS — Z80.3 FAMILY HISTORY OF BREAST CANCER: ICD-10-CM

## 2023-04-15 NOTE — PROGRESS NOTES
Office Note     Name: Jud Cerrato    : 1990     MRN: [id number]     Chief Complaint  Shoulder Pain and Follow-up    Subjective     History of Present Illness:  Jud Cerrato is a 32 y.o. female who presents today for a 1 month follow-up on shoulder pain.  Patient has been receiving physical therapy for chronic left shoulder pain.  She has had about 3 sessions.  She reports the physical therapy is going well and she has seen some improvements.  She thinks she will be able to continue with physical therapy for 6 to 8 weeks.  She did have a mammogram done and an ultrasound of the left breast which came back negative.  She was told to discuss with me regarding genetic testing due to a family history of breast cancer.  Otherwise, she reports feeling well without further complaints or concerns at this time.  Had a pleasant visit with the patient today.      Past Medical History:   Diagnosis Date   • Anemia [id number]    During pregnancy   • Anxiety     Off meds ~    • Chronic constipation    • Depression    • Gastritis    • GERD (gastroesophageal reflux disease)    • H/O: depression    • Headache    • History of kidney stones    • Obesity    • PTSD (post-traumatic stress disorder) - from uncle's death        Past Surgical History:   Procedure Laterality Date   • COLONOSCOPY     • ENDOSCOPY     • WISDOM TOOTH EXTRACTION  2015       Social History     Socioeconomic History   • Marital status: Single   Tobacco Use   • Smoking status: Former     Packs/day: 0.00     Years: 0.00     Pack years: 0.00     Types: Cigarettes     Start date: 2012     Quit date: 3/1/2017     Years since quittin.1   • Smokeless tobacco: Never   Substance and Sexual Activity   • Alcohol use: No   • Drug use: Never   • Sexual activity: Yes     Partners: Male     Birth control/protection: I.U.D.         Current Outpatient Medications:   •  Cholecalciferol (Vitamin D3) 50 MCG  "(2000 UT) capsule, Take 2 capsules by mouth Daily., Disp: , Rfl:   •  docusate sodium (COLACE) 50 MG capsule, Take  by mouth 2 (Two) Times a Day., Disp: , Rfl:   •  Elidel 1 % cream, APPLY EXTERNALLY TO AFFECTED AREA TWICE DAILY, Disp: , Rfl:   •  famotidine (PEPCID) 20 MG tablet, TAKE 1 TABLET BY MOUTH TWICE DAILY AS NEEDED FOR GERD, Disp: , Rfl:   •  ibuprofen (ADVIL,MOTRIN) 600 MG tablet, Take 1 tablet by mouth Every 6 (Six) Hours As Needed for Mild Pain., Disp: 30 tablet, Rfl: 1  •  linaCLOtide (LINZESS PO), Take  by mouth., Disp: , Rfl:   •  phentermine (ADIPEX-P) 37.5 MG tablet, , Disp: , Rfl:   •  polyethylene glycol (MiraLax) 17 GM/SCOOP powder, Take 17 g by mouth Daily. Dissolve in large glass of water and take daily, Disp: 507 g, Rfl: 4    Objective     Vital Signs  /84   Pulse 60   Temp 97.1 °F (36.2 °C)   Ht 165.1 cm (65\")   Wt (!) 142 kg (313 lb)   SpO2 99%   BMI 52.09 kg/m²   Estimated body mass index is 52.09 kg/m² as calculated from the following:    Height as of this encounter: 165.1 cm (65\").    Weight as of this encounter: 142 kg (313 lb).    Class 3 Severe Obesity (BMI >=40). Obesity-related health conditions include the following: Not addressed at this visit. Obesity is Not addressed at this visit. BMI is Not addressed at this visit. We discussed Not addressed at this visit.      Physical Exam  Constitutional:       Appearance: Normal appearance.   HENT:      Head: Normocephalic and atraumatic.      Nose: Nose normal.   Eyes:      Extraocular Movements: Extraocular movements intact.      Conjunctiva/sclera: Conjunctivae normal.      Pupils: Pupils are equal, round, and reactive to light.   Cardiovascular:      Rate and Rhythm: Normal rate.   Pulmonary:      Effort: Pulmonary effort is normal. No respiratory distress.   Musculoskeletal:         General: Normal range of motion.      Cervical back: Neck supple.   Skin:     General: Skin is warm and dry.   Neurological:      General: No " focal deficit present.      Mental Status: She is alert and oriented to person, place, and time. Mental status is at baseline.   Psychiatric:         Mood and Affect: Mood normal.         Behavior: Behavior normal.         Thought Content: Thought content normal.         Judgment: Judgment normal.          Assessment and Plan     Diagnoses and all orders for this visit:    1. Follow-up exam (Primary)    2. Chronic left shoulder pain    3. Family history of breast cancer  -     BRCA1 Targeted Analysis - Blood,; Future  -     BRCA2 Targeted Analysis - Blood,; Future    Plan:  Continue with physical therapy.  Glad to know that her mammogram and breast ultrasound came back negative.  Will order a BRCA testing.  Will review results with patient once received.  Patient is agreeable to schedule for an annual physical exam later this year.  Return to clinic after finishing physical therapy.  Return to clinic sooner if needed.    Follow Up  Return if symptoms worsen or fail to improve.    RYANNE Graham    Part of this note may be an electronic transcription/translation of spoken language to printed text using the Dragon Dictation System.  Answers for HPI/ROS submitted by the patient on 4/8/2023  Please describe your symptoms.: Regular check up  Have you had these symptoms before?: No  How long have you been having these symptoms?: 1-4 days  What is the primary reason for your visit?: Other

## 2023-04-20 ENCOUNTER — HOSPITAL ENCOUNTER (OUTPATIENT)
Dept: PHYSICAL THERAPY | Facility: HOSPITAL | Age: 33
Setting detail: THERAPIES SERIES
Discharge: HOME | End: 2023-04-20
Payer: MEDICAID

## 2023-04-20 DIAGNOSIS — M25.512 CHRONIC LEFT SHOULDER PAIN: Primary | ICD-10-CM

## 2023-04-20 DIAGNOSIS — G89.29 CHRONIC LEFT SHOULDER PAIN: Primary | ICD-10-CM

## 2023-04-20 PROCEDURE — 97140 MANUAL THERAPY 1/> REGIONS: CPT

## 2023-04-20 PROCEDURE — 97110 THERAPEUTIC EXERCISES: CPT

## 2023-04-20 NOTE — THERAPY PROGRESS REPORT/RE-CERT
Outpatient Physical Therapy Ortho Progress Note  [facility]     Patient Name: Jud Cerrato  : 1990  MRN: [id number]  Today's Date: 2023      Visit Date: 2023    Visit Dx:    ICD-10-CM ICD-9-CM   1. Chronic left shoulder pain  M25.512 719.41    G89.29 338.29       Patient Active Problem List   Diagnosis   • Annual GYN exam w/o problems   • Vitamin D deficiency   • Mirena   • Chronic left shoulder pain   • Family history of breast cancer        Past Medical History:   Diagnosis Date   • Anemia [id number]    During pregnancy   • Anxiety     Off meds ~    • Chronic constipation    • Depression    • Gastritis    • GERD (gastroesophageal reflux disease)    • H/O: depression    • Headache    • History of kidney stones    • Obesity    • PTSD (post-traumatic stress disorder) - from uncle's death         Past Surgical History:   Procedure Laterality Date   • COLONOSCOPY     • ENDOSCOPY     • WISDOM TOOTH EXTRACTION  2015        PT Ortho     Row Name 23 0730       General ROM    GENERAL ROM COMMENTS Left shoulder abduction, flexion, external rotation within normal limits passively and actively full range.  Internal rotation actively limited with mild range of motion functionally.  -       MMT (Manual Muscle Testing)    Rt Upper Ext Rt Shoulder Flexion;Rt Shoulder ABduction;Rt Shoulder Internal Rotation;Rt Shoulder External Rotation  -       MMT Right Upper Ext    Rt Shoulder Flexion MMT, Gross Movement --  16.4  -    Rt Shoulder ABduction MMT, Gross Movement --  16.8  -    Rt Shoulder Internal Rotation MMT, Gross Movement --  16.4  -    Rt Shoulder External Rotation MMT, Gross Movement --  19.7#  -    Rt Upper Extremity Comments  The hand-held dynamometer  -       MMT Left Upper Ext    Lt Shoulder Flexion MMT, Gross Movement --  12.6#  -    Lt Shoulder ABduction MMT, Gross Movement --  11.6#  -    Lt Shoulder Internal Rotation MMT,  Gross Movement --  17.6#  -    Lt Shoulder External Rotation MMT, Gross Movement --  17.6#  -    Lt Upper Extremity Comments  Hand-held dynamometer  -          User Key  (r) = Recorded By, (t) = Taken By, (c) = Cosigned By    Initials Name Provider Type     Won Willoughby, PT Physical Therapist                             PT Assessment/Plan     Row Name 04/20/23 9923          PT Assessment    Functional Limitations Limitation in home management;Performance in self-care ADL;Performance in leisure activities;Performance in work activities;Limitations in community activities  -     Impairments Range of motion;Pain;Muscle strength;Joint mobility;Joint integrity;Posture  -     Assessment Comments Patient reports that she feels about 50% return to baseline shoulder function despite QuickDASH scores not significantly improving.  Patient has had increased pain due to increased workloads over the last few days which may have altered current QuickDASH scores.  She presented today with posterior shoulder pain that was improved with posterior to anterior glenohumeral joint mobilizations and posterior capsule stretching.  Sleeper and across body posterior capsule stretching were implemented today with improved range of motion and pain following and added to HEP as well.  Continued skilled physical therapy interventions warranted to address deficits and meet patient goals.  -     Please refer to paper survey for additional self-reported information Yes  -     Rehab Potential Good  -     Patient/caregiver participated in establishment of treatment plan and goals Yes  -     Patient would benefit from skilled therapy intervention Yes  -        PT Plan    PT Frequency 1x/week;2x/week  -     Predicted Duration of Therapy Intervention (PT) 8-10 visits total  -     Planned CPT's? 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  -         Subjective Pain    Able to rate subjective pain? yes  -      Pre-Treatment Pain Level 1  -      Post-Treatment Pain Level 1  -         Total Minutes    90874 - PT Therapeutic Exercise Minutes 43  -      94615 - PT Manual Therapy Minutes 10  -         Exercise 1    Exercise Name 1 UBE 5 min  -         Exercise 2    Exercise Name 2 15 minutes taken to complete outcomes, objective measures, review and modify HEP with technique performance, and update patient goals.  -         Exercise 3    Exercise Name 3 Quadruped scapular push-ups 2 x 10  -         Exercise 4    Exercise Name 4 Quadruped scapular push-ups 2 x 10 with yellow Thera-Band  -         Exercise 5    Exercise Name 5 Field goal goalpost 2 x 10  -         Exercise 6    Exercise Name 6 Serratus wall slides 2 x 10 yellow Thera-Band  -         Exercise 7    Exercise Name 7 Yellow Thera-Band pull-apart's 2 x 10 at 20 degrees, 70 degrees, and 100 degrees shoulder flexion  -         Exercise 8    Exercise Name 8 Sleeper stretch and cross body posterior capsule stretch 2 minutes each  -         Exercise 9    Exercise Name 9 5 minutes for K taping of left shoulder to promote tactile feedback to rotator cuff muscles.  -            User Key  (r) = Recorded By, (t) = Taken By, (c) = Cosigned By    Initials Name Provider Type    Won Olsen PT Physical Therapist                         Manual Rx (last 36 hours)     Manual  Treatments     Row Name 04/20/23 0730             Total Minutes    09819 - PT Manual Therapy Minutes 10  -         Manual Rx 1    Manual Rx 1 Location Left shoulder  -      Manual Rx 1 Type Glenohumeral joint mobilizations with patient supine with posterior and inferior glides, patient prone anterior joint glides, passive range of motion with oscillations throughout full range abduction and scaption  -      Manual Rx 1 Duration 10 minutes  -            User Key  (r) = Recorded By, (t) = Taken By, (c) = Cosigned By    Initials Name Provider Type    Won Olsen, PT Physical Therapist                 PT OP Goals     Row Name 04/20/23 0730          PT Short Term Goals    STG Date to Achieve 04/13/23  -     STG 1 QuickDASH to 15 or less to indicate improved shoulder function with daily activities  -     STG 1 Progress Ongoing  -     STG 2 Patient will show adherence to HEP for optimal outcomes  -     STG 2 Progress Ongoing  -     STG 3 Patient will have improvement in symptoms by 50% or more daily activities  -     STG 3 Progress Met  -        Long Term Goals    LTG Date to Achieve 05/04/23  -     LTG 1 QuickDASH to 10 or less to indicate improved shoulder function with daily activities  -     LTG 1 Progress Ongoing  -     LTG 2 Patient will be able to lift 10 pounds overhead with no pain  -     LTG 2 Progress New  -     LTG 3 Patient will be able to wash her back with no pain and no difficulty  -     LTG 3 Progress Ongoing  -     LTG 3 Progress Comments mod difficulty  -        Time Calculation    PT Goal Re-Cert Due Date 06/21/23  -           User Key  (r) = Recorded By, (t) = Taken By, (c) = Cosigned By    Initials Name Provider Type    Won Olsen, NANCY Physical Therapist                Therapy Education  Education Details: HEP: Sleeper stretch, posterior capsule cross body stretch, focal post, quadruped Scap push-ups, W pull-apart's, red Thera-Band provided  Given:  HEP, Symptoms/condition management, Pain management, Posture/body mechanics  Program: Progressed  How Provided: Verbal, Demonstration, Written  Provided to: Patient  Level of Understanding: Teach back education performed, Verbalized, Demonstrated    Outcome Measure Options: Quick DASH  Quick DASH  Open a tight or new jar.: No Difficulty  Do heavy household chores (e.g., wash walls, wash floors): No Difficulty  Carry a shopping bag or briefcase: Mild Difficulty  Wash your back: Moderate Difficulty  Use a knife to cut food: No Difficulty  Recreational activities in which you take some force or impact through your arm, should or hand (e.g. golf, hammering, tennis, etc.): Moderate Difficulty  During the past week, to what extent has your arm, shoulder, or hand problem interfered with your normal social activites with family, friends, neighbors or groups?: Slightly  During the past week, were you limited in your work or other regular daily activities as a result of your arm, shoulder or hand problem?: Slightly Limited  Arm, Shoulder, or hand pain: Moderate  Tingling (pins and needles) in your arm, shoulder, or hand: Mild  During the past week, how much difficulty have you had sleeping because of the pain in your arm, shoulder or hand?: Mild Difficulty  Number of Questions Answered: 11  Quick DASH Score: 25         Time Calculation:   Start Time: 0730  Timed Charges  98870 - PT Therapeutic Exercise Minutes: 43  90499 - PT Manual Therapy Minutes: 10  Total Minutes  Timed Charges Total Minutes: 53   Total Minutes: 53  Therapy Charges for Today     Code Description Service Date Service Provider Modifiers Qty    32918404487 HC PT THER PROC EA 15 MIN 4/20/2023 Won Willoughby, PT GP 3    81733592207 HC PT MANUAL THERAPY EA 15 MIN 4/20/2023 Won Willoughby, PT GP 1          PT G-Codes  Outcome Measure Options: Quick DASH  Quick DASH Score: 25         Won Willoughby PT  4/20/2023

## 2023-04-26 ENCOUNTER — HOSPITAL ENCOUNTER (OUTPATIENT)
Dept: PHYSICAL THERAPY | Facility: HOSPITAL | Age: 33
Setting detail: THERAPIES SERIES
Discharge: HOME | End: 2023-04-26
Payer: MEDICAID

## 2023-04-26 DIAGNOSIS — G89.29 CHRONIC LEFT SHOULDER PAIN: Primary | ICD-10-CM

## 2023-04-26 DIAGNOSIS — M25.512 CHRONIC LEFT SHOULDER PAIN: Primary | ICD-10-CM

## 2023-04-26 PROCEDURE — 97110 THERAPEUTIC EXERCISES: CPT

## 2023-04-26 NOTE — THERAPY TREATMENT NOTE
Outpatient Physical Therapy Ortho Treatment Note   [facility]     Patient Name: Jud Cerrato  : 1990  MRN: [id number]  Today's Date: 2023      Visit Date: 2023    Visit Dx:    ICD-10-CM ICD-9-CM   1. Chronic left shoulder pain  M25.512 719.41    G89.29 338.29       Patient Active Problem List   Diagnosis   • Annual GYN exam w/o problems   • Vitamin D deficiency   • Mirena   • Chronic left shoulder pain   • Family history of breast cancer        Past Medical History:   Diagnosis Date   • Anemia [id number]    During pregnancy   • Anxiety     Off meds ~    • Chronic constipation    • Depression    • Gastritis    • GERD (gastroesophageal reflux disease)    • H/O: depression    • Headache    • History of kidney stones    • Obesity    • PTSD (post-traumatic stress disorder) - from uncle's death         Past Surgical History:   Procedure Laterality Date   • COLONOSCOPY     • ENDOSCOPY     • WISDOM TOOTH EXTRACTION  2015                        PT Assessment/Plan     Row Name 23 0815          PT Assessment    Assessment Comments Patient presents to clinic with no significant pain this date but did have some irritation a few days ago that has completely diminished.  She was able to progress today to volume with there ex and continue to focus on rotator cuff and scapular strengthening as tolerated.  We will start progressing towards independent home exercise program as patient is starting to require less cues for TherEX.  -        PT Plan    PT Plan Comments Continue per plan of care progress towards home exercise program independently performed by end of May  -           User Key  (r) = Recorded By, (t) = Taken By, (c) = Cosigned By    Initials Name Provider Type    Won Olsen, PT Physical Therapist                   OP Exercises     Row Name 23 0815             Subjective Comments    Subjective Comments Patient reports significant  arm pain a few days ago where she had to take 4 Tylenol and use lidocaine patches but pain has now stopped.  -         Subjective Pain    Able to rate subjective pain? yes  -      Pre-Treatment Pain Level 0  -      Post-Treatment Pain Level 0  -         Total Minutes    86746 - PT Therapeutic Exercise Minutes 30  -         Exercise 1    Exercise Name 1 UBE 5 min  -         Exercise 2    Exercise Name 2 Machine rows 4 x 10 4 plates  -         Exercise 3    Exercise Name 3 Singlearm Lat pulldown 3 x 8 5 plates  -         Exercise 4    Exercise Name 4 Quadruped scapular push-ups 2 x 15  -         Exercise 5    Exercise Name 5 Serratus wall slides with yellow Thera-Band and Y's 2 x 15  -         Exercise 6    Exercise Name 6 Standing while lift offs with yellow Thera-Band 2 x 15  -         Exercise 7    Exercise Name 7 Yellow Thera-Band lateral raises and scaption 2 x 10 each  -            User Key  (r) = Recorded By, (t) = Taken By, (c) = Cosigned By    Initials Name Provider Type     Won Willoughby, PT Physical Therapist                                                Time Calculation:   Start Time: 0815  Timed Charges  81384 - PT Therapeutic Exercise Minutes: 30  Total Minutes  Timed Charges Total Minutes: 30   Total Minutes: 30  Therapy Charges for Today     Code Description Service Date Service Provider Modifiers Qty    89366032110  PT THER PROC EA 15 MIN 4/26/2023 Won Willoughby, PT GP 2                    Won Willoughby PT  4/26/2023

## 2023-05-16 ENCOUNTER — HOSPITAL ENCOUNTER (OUTPATIENT)
Dept: PHYSICAL THERAPY | Facility: HOSPITAL | Age: 33
Setting detail: THERAPIES SERIES
Discharge: HOME | End: 2023-05-16
Payer: MEDICAID

## 2023-05-16 DIAGNOSIS — G89.29 CHRONIC LEFT SHOULDER PAIN: Primary | ICD-10-CM

## 2023-05-16 DIAGNOSIS — M25.512 CHRONIC LEFT SHOULDER PAIN: Primary | ICD-10-CM

## 2023-05-16 PROCEDURE — 97110 THERAPEUTIC EXERCISES: CPT

## 2023-05-16 NOTE — THERAPY DISCHARGE NOTE
Outpatient Physical Therapy Ortho Treatment Note/Discharge Summary   [place]     Patient Name: Jud Cerrato  : 1990  MRN: [id number]  Today's Date: 2023      Visit Date: 2023    Visit Dx:    ICD-10-CM ICD-9-CM   1. Chronic left shoulder pain  M25.512 719.41    G89.29 338.29       Patient Active Problem List   Diagnosis   • Annual GYN exam w/o problems   • Vitamin D deficiency   • Mirena   • Chronic left shoulder pain   • Family history of breast cancer        Past Medical History:   Diagnosis Date   • Anemia [phone]    During pregnancy   • Anxiety     Off meds ~ 2018   • Chronic constipation    • Depression    • Gastritis    • GERD (gastroesophageal reflux disease)    • H/O: depression    • Headache    • History of kidney stones    • Obesity    • PTSD (post-traumatic stress disorder) - from uncle's death         Past Surgical History:   Procedure Laterality Date   • COLONOSCOPY     • ENDOSCOPY     • WISDOM TOOTH EXTRACTION  2015        PT Ortho     Row Name 23 0800       Shoulder Impingement/Rotator Cuff Special Tests    Shoulder Impingement/Rotator Cuff Special Tests Comments No significant reproduction of pain with left shoulder rotator cuff resistance with special tests  -       General ROM    GENERAL ROM COMMENTS Left shoulder flexion 160 degrees, abduction 160 degrees, internal rotation mildly limited but no pain.  No pain with all active range of motion.  -       MMT (Manual Muscle Testing)    General MMT Comments Left shoulder flexion 4+/5, abduction 4+/5, internal rotation 5/5, external rotation 5/5.  Very mild weakness in the left shoulder scaption but not significant.  -          User Key  (r) = Recorded By, (t) = Taken By, (c) = Cosigned By    Initials Name Provider Type    Won Olsen, PT Physical Therapist                             PT Assessment/Plan     Row Name 23 0800          PT Assessment     Assessment Comments Patient's shoulder has much improved throughout her physical therapy plan of care and appropriate to discharge this date due to meeting all goals, being independent with home exercise program, and no significant limitations or impairments limiting daily activities or quality of life.  Patient is reporting that her shoulder has been much improved over the last couple of weeks and sometimes she feels it is just weak and needs a little bit more strength.  Patient agreeable and acknowledged continuing HEP 1-3 times per week education provided on continuing at home independently.  -     Patient/caregiver participated in establishment of treatment plan and goals Yes  -        PT Plan    PT Plan Comments Discharge and may perform HEP at home 1-3 times per week for the next month or 2 and educate did patient on maintaining active lifestyle.  -           User Key  (r) = Recorded By, (t) = Taken By, (c) = Cosigned By    Initials Name Provider Type    Won Olsen, PT Physical Therapist                     OP Exercises     Row Name 05/16/23 0800             Subjective Comments    Subjective Comments Patient reports her shoulder has not been bothering her over the last few weeks.  Patient reports that she feels appropriate to discharge recommendations and agreeable.  Patient reports that she does notice some weakness when holding a baby after 10 to 15 minutes to start to get shaky but just seems a little weak and no pain at all.  -         Subjective Pain    Able to rate subjective pain? yes  -      Pre-Treatment Pain Level 0  -      Post-Treatment Pain Level 0  -         Total Minutes    20018 - PT Therapeutic Exercise Minutes 10  -         Exercise 1    Exercise Name 1 10 minutes taken to update goals, outcomes, objective measures, educate patient on plan of care and discharge recommendations.  -            User Key  (r) = Recorded By, (t) = Taken By, (c) = Cosigned By    Initials  Name Provider Type    Won Olsen, PT Physical Therapist                                PT OP Goals     Row Name 05/16/23 0800          PT Short Term Goals    STG Date to Achieve 04/13/23  -     STG 1 QuickDASH to 15 or less to indicate improved shoulder function with daily activities  -     STG 1 Progress Met  -     STG 2 Patient will show adherence to HEP for optimal outcomes  -     STG 2 Progress Met  -     STG 3 Patient will have improvement in symptoms by 50% or more daily activities  -     STG 3 Progress Met  -        Long Term Goals    LTG Date to Achieve 05/04/23  -     LTG 1 QuickDASH to 10 or less to indicate improved shoulder function with daily activities  -     LTG 1 Progress Met  -     LTG 2 Patient will be able to lift 10 pounds overhead with no pain  -     LTG 2 Progress Met  -     LTG 3 Patient will be able to wash her back with no pain and no difficulty  -     LT 3 Progress [facility]        Time Calculation    PT Goal Re-Cert Due Date 06/21/23  -           User Key  (r) = Recorded By, (t) = Taken By, (c) = Cosigned By    Initials Name Provider Type    Won Olsen, PT Physical Therapist                     Outcome Measure Options: Quick DASH, Disabilities of the Arm, Shoulder, and Hand (DASH)  DASH  Open a tight or new jar.: No Difficulty  Write: No Difficulty  Turn a key: No Difficulty  Prepare a meal: No Difficulty  Push open a heavy door: No Difficulty  Place an object on a shelf above your head: No Difficulty  Do heavy household chores (e.g., wash walls, wash floors): No Difficulty  Garden or do yard work: No Difficulty  Make a bed: No Difficulty  Carry a shopping bag or briefcase: No Difficulty  Carry a heavy object (over 10 lbs): No Difficulty  Change a lightbulb overhead: No Difficulty  Wash or blow dry your hair: No Difficulty  Wash your back: Mild Difficulty  Put on a pullover sweater: No Difficulty  Use a knife to cut food: No  Difficulty  Recreational activities in which require little effort (e.g., cardplaying, knitting, etc.): No Difficulty  Recreational activities in which you take some force or impact through your arm, should or hand (e.g. golf, hammering, tennis, etc.): No Difficulty  Recreational Activities in which you move your arm freely (e.g., frisbee, badminton, etc.): No Difficulty  Manage transportation needs (getting from one place to another): No Difficulty  Sexual Activities: No Difficulty  During the past week, to what extent has your arm, shoulder, or hand problem interfered with your normal social activites with family, friends, neighbors or groups?: Not at all  During the past week, were you limited in your work or other regular daily activities as a result of your arm, shoulder or hand problem?: Not limited at all  Arm, Shoulder, or hand pain: Mild  Arm, shoulder or hand pain when you performed any specific activity: None  Tingling (pins and needles) in your arm, shoulder, or hand: None  Weakness in your arm, shoulder or hand: Mild  Stiffness in your arm, shoulder or hand: None  During the past week, how much difficulty have you had sleeping because of the pain in your arm, shoulder or hand?: No difficulty  I feel less capable, less confident or less useful because of my arm, shoulder or hand problem: Strongly disagree  DASH Sum : 33  Number of Questions Answered: 31  DASH Score: 1.61  Quick DASH  Open a tight or new jar.: No Difficulty  Do heavy household chores (e.g., wash walls, wash floors): No Difficulty  Carry a shopping bag or briefcase: No Difficulty  Wash your back: Mild Difficulty  Use a knife to cut food: No Difficulty  Recreational activities in which you take some force or impact through your arm, should or hand (e.g. golf, hammering, tennis, etc.): No Difficulty  During the past week, to what extent has your arm, shoulder, or hand problem interfered with your normal social activites with family, friends,  neighbors or groups?: Not at all  During the past week, were you limited in your work or other regular daily activities as a result of your arm, shoulder or hand problem?: Not limited at all  Arm, Shoulder, or hand pain: Mild  Tingling (pins and needles) in your arm, shoulder, or hand: None  During the past week, how much difficulty have you had sleeping because of the pain in your arm, shoulder or hand?: No difficulty  Number of Questions Answered: 11  Quick DASH Score: 4.55         Time Calculation:   Start Time: 0800  Timed Charges  56269 - PT Therapeutic Exercise Minutes: 10  Total Minutes  Timed Charges Total Minutes: 10   Total Minutes: 10  Therapy Charges for Today     Code Description Service Date Service Provider Modifiers Qty    42806601031 HC PT THER PROC EA 15 MIN 5/16/2023 Won Willoughby, PT GP 1          PT G-Codes  Outcome Measure Options: Quick DASH, Disabilities of the Arm, Shoulder, and Hand (DASH)  Quick DASH Score: 4.55     OP PT Discharge Summary  Date of Discharge: 05/16/23  Reason for Discharge: All goals achieved, Maximum functional potential achieved, Independent, At baseline function  Outcomes Achieved: Able to achieve all goals within established timeline  Discharge Destination: Home without follow-up      Won Willoughby, PT  5/16/2023

## 2023-10-17 ENCOUNTER — OFFICE VISIT (OUTPATIENT)
Dept: OBSTETRICS AND GYNECOLOGY | Facility: CLINIC | Age: 33
End: 2023-10-17
Payer: MEDICAID

## 2023-10-17 VITALS
WEIGHT: 293 LBS | BODY MASS INDEX: 51.35 KG/M2 | DIASTOLIC BLOOD PRESSURE: 78 MMHG | RESPIRATION RATE: 16 BRPM | SYSTOLIC BLOOD PRESSURE: 110 MMHG

## 2023-10-17 DIAGNOSIS — Z11.3 SCREENING FOR VENEREAL DISEASE: ICD-10-CM

## 2023-10-17 DIAGNOSIS — Z01.419 WELL WOMAN EXAM: Primary | ICD-10-CM

## 2023-10-17 DIAGNOSIS — N91.4 SECONDARY OLIGOMENORRHEA: ICD-10-CM

## 2023-10-17 DIAGNOSIS — Z30.431 CHECKING OF INTRAUTERINE DEVICE: ICD-10-CM

## 2023-10-17 DIAGNOSIS — Z71.85 VACCINE COUNSELING: ICD-10-CM

## 2023-10-17 PROBLEM — M25.512 CHRONIC LEFT SHOULDER PAIN: Status: RESOLVED | Noted: 2023-04-15 | Resolved: 2023-10-17

## 2023-10-17 PROBLEM — G47.30 SLEEP APNEA: Status: ACTIVE | Noted: 2020-01-01

## 2023-10-17 PROBLEM — G89.29 CHRONIC LEFT SHOULDER PAIN: Status: RESOLVED | Noted: 2023-04-15 | Resolved: 2023-10-17

## 2023-10-17 PROBLEM — Z80.3 FAMILY HISTORY OF BREAST CANCER: Status: RESOLVED | Noted: 2023-04-15 | Resolved: 2023-10-17

## 2023-10-17 NOTE — PATIENT INSTRUCTIONS
Influenza (Flu) Vaccine (Inactivated or Recombinant): What You Need to Know      1. Why get vaccinated?  Influenza vaccine can prevent influenza (flu).  Flu is a contagious disease that spreads around the [place] every year, usually between October and May. Anyone can get the flu, but it is more dangerous for some people. Infants and young children, people 65 years of age and older, pregnant women, and people with certain health conditions or a weakened immune system are at greatest risk of flu complications.  Pneumonia, bronchitis, sinus infections and ear infections are examples of flu-related complications. If you have a medical condition, such as heart disease, cancer or diabetes, flu can make it worse.  Flu can cause fever and chills, sore throat, muscle aches, fatigue, cough, headache, and runny or stuffy nose. Some people may have vomiting and diarrhea, though this is more common in children than adults.  Each year thousands of people in the [place] die from flu, and many more are hospitalized. Flu vaccine prevents millions of illnesses and flu-related visits to the doctor each year.  2. Influenza vaccine  CDC recommends everyone 6 months of age and older get vaccinated every flu season. Children 6 months through 8 years of age may need 2 doses during a single flu season. Everyone else needs only 1 dose each flu season.  It takes about 2 weeks for protection to develop after vaccination.  There are many flu viruses, and they are always changing. Each year a new flu vaccine is made to protect against three or four viruses that are likely to cause disease in the upcoming flu season. Even when the vaccine doesn't exactly match these viruses, it may still provide some protection.  Influenza vaccine does not cause flu.  Influenza vaccine may be given at the same time as other vaccines.  3. Talk with your health care provider  Tell your vaccine provider if the person getting the vaccine:  Has had an  allergic reaction after a previous dose of influenza vaccine, or has any severe, life-threatening allergies.  Has ever had Guillain-Barré Syndrome (also called GBS).  In some cases, your health care provider may decide to postpone influenza vaccination to a future visit.  People with minor illnesses, such as a cold, may be vaccinated. People who are moderately or severely ill should usually wait until they recover before getting influenza vaccine.  Your health care provider can give you more information.  4. Risks of a vaccine reaction  Soreness, redness, and swelling where shot is given, fever, muscle aches, and headache can happen after influenza vaccine.  There may be a very small increased risk of Guillain-Barré Syndrome (GBS) after inactivated influenza vaccine (the flu shot).  Young children who get the flu shot along with pneumococcal vaccine (PCV13), and/or DTaP vaccine at the same time might be slightly more likely to have a seizure caused by fever. Tell your health care provider if a child who is getting flu vaccine has ever had a seizure.  People sometimes faint after medical procedures, including vaccination. Tell your provider if you feel dizzy or have vision changes or ringing in the ears.  As with any medicine, there is a very remote chance of a vaccine causing a severe allergic reaction, other serious injury, or death.  5. What if there is a serious problem?  An allergic reaction could occur after the vaccinated person leaves the clinic. If you see signs of a severe allergic reaction (hives, swelling of the face and throat, difficulty breathing, a fast heartbeat, dizziness, or weakness), call 9-1-1 and get the person to the nearest hospital.  For other signs that concern you, call your health care provider.  Adverse reactions should be reported to the Vaccine Adverse Event Reporting System (VAERS). Your health care provider will usually file this report, or you can do it yourself. Visit the VAERS  website at www.vaers.[facility].gov or call 1-813.902.3590.VAERS is only for reporting reactions, and VAERS staff do not give medical advice.  6. The National Vaccine Injury Compensation Program  The National Vaccine Injury Compensation Program (VICP) is a federal program that was created to compensate people who may have been injured by certain vaccines. Visit the VICP website at www.[facility].gov/vaccinecompensation or call 1-649.291.5569 to learn about the program and about filing a claim. There is a time limit to file a claim for compensation.  7. How can I learn more?  Ask your healthcare provider.  Call your local or state health department.  Contact the Centers for Disease Control and Prevention (CDC):  Call 1-883.631.3578 (8-526-LTY-INFO) or  Visit CDC's www.cdc.gov/flu    Vaccine Information Statement (Interim) Inactivated Influenza Vaccine (8/15/2019)  This information is not intended to replace advice given to you by your health care provider. Make sure you discuss any questions you have with your health care provider.  Document Released: 10/12/2007 Document Revised: 2020 Document Reviewed: 2019  Elsevier Patient Education ©  [employer]        Tdap Vaccine (Tetanus, Diphtheria and Pertussis): What You Need to Know      1. Why get vaccinated?  Tetanus, diphtheria and pertussis are very serious diseases. Tdap vaccine can protect us from these diseases. And, Tdap vaccine given to pregnant women can protect  babies against pertussis..  TETANUS (Lockjaw) is rare in the [place] today. It causes painful muscle tightening and stiffness, usually all over the body.  It can lead to tightening of muscles in the head and neck so you can't open your mouth, swallow, or sometimes even breathe. Tetanus kills about 1 out of 10 people who are infected even after receiving the best medical care.  DIPHTHERIA is also rare in the [place] today. It can cause a thick coating to form in the back of the  throat.  It can lead to breathing problems, heart failure, paralysis, and death.  PERTUSSIS (Whooping Cough) causes severe coughing spells, which can cause difficulty breathing, vomiting and disturbed sleep.  It can also lead to weight loss, incontinence, and rib fractures. Up to 2 in 100 adolescents and 5 in 100 adults with pertussis are hospitalized or have complications, which could include pneumonia or death.    These diseases are caused by bacteria. Diphtheria and pertussis are spread from person to person through secretions from coughing or sneezing. Tetanus enters the body through cuts, scratches, or wounds.  Before vaccines, as many as 200,000 cases of diphtheria, 200,000 cases of pertussis, and hundreds of cases of tetanus, were reported in the [place] each year. Since vaccination began, reports of cases for tetanus and diphtheria have dropped by about 99% and for pertussis by about 80%.    2. Tdap vaccine  Tdap vaccine can protect adolescents and adults from tetanus, diphtheria, and pertussis. One dose of Tdap is routinely given at age 11 or 12. People who did not get Tdap at that age should get it as soon as possible.  Tdap is especially important for healthcare professionals and anyone having close contact with a baby younger than 12 months.  Pregnant women should get a dose of Tdap during every pregnancy, to protect the  from pertussis. Infants are most at risk for severe, life-threatening complications from pertussis.  Another vaccine, called Td, protects against tetanus and diphtheria, but not pertussis. A Td booster should be given every 10 years. Tdap may be given as one of these boosters if you have never gotten Tdap before. Tdap may also be given after a severe cut or burn to prevent tetanus infection.  Your doctor or the person giving you the vaccine can give you more information.  Tdap may safely be given at the same time as other vaccines.    3. Some people should not get this  vaccine  A person who has ever had a life-threatening allergic reaction after a previous dose of any diphtheria, tetanus or pertussis containing vaccine, OR has a severe allergy to any part of this vaccine, should not get Tdap vaccine. Tell the person giving the vaccine about any severe allergies.  Anyone who had coma or long repeated seizures within 7 days after a childhood dose of DTP or DTaP, or a previous dose of Tdap, should not get Tdap, unless a cause other than the vaccine was found. They can still get Td.  Talk to your doctor if you:  have seizures or another nervous system problem,  had severe pain or swelling after any vaccine containing diphtheria, tetanus or pertussis,  ever had a condition called Guillain-Barré Syndrome (GBS),  aren't feeling well on the day the shot is scheduled.    4. Risks  With any medicine, including vaccines, there is a chance of side effects. These are usually mild and go away on their own. Serious reactions are also possible but are rare.  Most people who get Tdap vaccine do not have any problems with it.  Mild problems following Tdap  (Did not interfere with activities)  Pain where the shot was given (about 3 in 4 adolescents or 2 in 3 adults)  Redness or swelling where the shot was given (about 1 person in 5)  Mild fever of at least 100.4°F (up to about 1 in 25 adolescents or 1 in 100 adults)  Headache (about 3 or 4 people in 10)  Tiredness (about 1 person in 3 or 4)  Nausea, vomiting, diarrhea, stomach ache (up to 1 in 4 adolescents or 1 in 10 adults)  Chills, sore joints (about 1 person in 10)  Body aches (about 1 person in 3 or 4)  Rash, swollen glands (uncommon)  Moderate problems following Tdap  (Interfered with activities, but did not require medical attention)  Pain where the shot was given (up to 1 in 5 or 6)  Redness or swelling where the shot was given (up to about 1 in 16 adolescents or 1 in 12 adults)  Fever over 102°F (about 1 in 100 adolescents or 1 in 250  adults)  Headache (about 1 in 7 adolescents or 1 in 10 adults)  Nausea, vomiting, diarrhea, stomach ache (up to 1 or 3 people in 100)  Swelling of the entire arm where the shot was given (up to about 1 in 500).  Severe problems following Tdap  (Unable to perform usual activities; required medical attention)  Swelling, severe pain, bleeding and redness in the arm where the shot was given (rare).  Problems that could happen after any vaccine:  People sometimes faint after a medical procedure, including vaccination. Sitting or lying down for about 15 minutes can help prevent fainting, and injuries caused by a fall. Tell your doctor if you feel dizzy, or have vision changes or ringing in the ears.  Some people get severe pain in the shoulder and have difficulty moving the arm where a shot was given. This happens very rarely.  Any medication can cause a severe allergic reaction. Such reactions from a vaccine are very rare, estimated at fewer than 1 in a million doses, and would happen within a few minutes to a few hours after the vaccination.  As with any medicine, there is a very remote chance of a vaccine causing a serious injury or death.  The safety of vaccines is always being monitored. For more information, visit: www.cdc.gov/vaccinesafety/    5. What if there is a serious problem?  What should I look for?  Look for anything that concerns you, such as signs of a severe allergic reaction, very high fever, or unusual behavior.  Signs of a severe allergic reaction can include hives, swelling of the face and throat, difficulty breathing, a fast heartbeat, dizziness, and weakness. These would usually start a few minutes to a few hours after the vaccination.  What should I do?  If you think it is a severe allergic reaction or other emergency that can't wait, call 9-1-1 or get the person to the nearest hospital. Otherwise, call your doctor.  Afterward, the reaction should be reported to the Vaccine Adverse Event Reporting  System (TYMR). Your doctor might file this report, or you can do it yourself through the TYMR web site at www.vaers.[facility].gov, or by calling 1-373.621.2465.  TYMR does not give medical advice.    6. The National Vaccine Injury Compensation Program  The National Vaccine Injury Compensation Program (VICP) is a federal program that was created to compensate people who may have been injured by certain vaccines.  Persons who believe they may have been injured by a vaccine can learn about the program and about filing a claim by calling 1-809.885.1682 or visiting the VICP website at www.[facility].gov/vaccinecompensation. There is a time limit to file a claim for compensation.    7. How can I learn more?  Ask your doctor. He or she can give you the vaccine package insert or suggest other sources of information.  Call your local or state health department.  Contact the Centers for Disease Control and Prevention (CDC):  Call 1-107.276.3797 (3-149-TBU-INFO) or  Visit CDC's website at www.cdc.gov/vaccines      Vaccine Information Statement Tdap Vaccine (2/24/2015)  This information is not intended to replace advice given to you by your health care provider. Make sure you discuss any questions you have with your health care provider.  Document Released: 06/18/2013 Document Revised: 08/05/2019 Document Reviewed: 08/05/2019  Elsevier Interactive Patient Education © 2019 Elsevier Inc.

## 2023-10-17 NOTE — PROGRESS NOTES
Subjective   Chief Complaint   Patient presents with    Gynecologic Exam     Annual, no c/c, questions re: sporadic pelvic cramping     Jud Cerrato is a 32 y.o. year old  presenting to be seen for her annual exam.  She does have pelvic cramps at times.  Having no other issues.  Does have a longstanding history of chronic constipation.    SEXUAL Hx:  She is currently sexually active.  In the past year there there has been NO new sexual partners.    Condoms are never used.  She would like to be screened for STD's at today's exam.  Current birth control method: IUD - Mirena.  She is happy with her current method of contraception and does not want to discuss alternative methods of contraception.  MENSTRUAL Hx:  Patient's last menstrual period was 08/15/2023 (within weeks).  In the past 6 months her cycles have been infrequent.  Her menstrual flow is typically light.   Each month on average there are roughly 0 day(s) of very heavy flow.  Intermenstrual bleeding is absent.    Post-coital bleeding is absent.  Dysmenorrhea: is not affecting her activities of daily living  PMS: is not affecting her activities of daily living  Her cycles are not a source of concern for her that she wishes to discuss today.  HEALTH Hx:  She exercises regularly: no (and has no plans to become more active).  She wears her seat belt: yes.  She has concerns about domestic violence: no.    The following portions of the patient's history were reviewed and updated as appropriate:problem list, current medications, allergies, past family history, past medical history, past social history, and past surgical history.    Social History    Tobacco Use      Smoking status: Former        Packs/day: 0.00        Years: 0.00        Additional pack years: 0.00        Total pack years: 0.00        Types: Cigarettes        Start date: 2012        Quit date: 3/1/2017        Years since quittin.6      Smokeless tobacco: Never    Review of  Systems  Constitutional POS: nothing reported    NEG: anorexia or night sweats   Genitourinary POS: nocturia and it IS NOT effecting her daily living    NEG: dysuria or hematuria      Gastointestinal POS: constipation (chronic)    NEG: bloating, change in bowel habits, melena, or reflux symptoms   Integument POS: nothing reported    NEG: moles that are changing in size, shape, color or rashes   Breast POS: nothing reported    NEG: persistent breast lump, skin dimpling, or nipple discharge        Objective   /78 (BP Location: Right arm, Patient Position: Sitting, Cuff Size: Large Adult)   Resp 16   Wt (!) 140 kg (308 lb 9.6 oz)   LMP 08/15/2023 (Within Weeks)   BMI 51.35 kg/m²     General:  well developed; well nourished  no acute distress   Skin:  No suspicious lesions seen   Thyroid: normal to inspection and palpation   Breasts:  Examined in supine position  Symmetric without masses or skin dimpling  Nipples normal without inversion, lesions or discharge  There are no palpable axillary nodes   Abdomen: soft, non-tender; no masses  no umbilical or inguinal hernias are present  no hepato-splenomegaly   Pelvis: Exam limited by  body habitus  Clinical staff was present for exam  External genitalia:  normal appearance of the external genitalia including Bartholin's and [place]'s glands.  :  urethral meatus normal;  Vaginal:  normal pink mucosa without prolapse or lesions.  Cervix:  normal appearance. IUD string not seen;  Uterus:  normal size, shape and consistency.  Adnexa:  non palpable bilaterally.  Rectal:  digital rectal exam not performed; anus visually normal appearing.        Assessment   Normal GYN exam limited by weight  Oligomenorrhea - most consistent with Mirena use  Obesity  Pelvic cramps most likely related to constipation.  Ultrasound done after IUD placement showed correct position and she is having no other symptoms that would suggest a gynecologic source for her pelvic cramps.       Plan    Pap and STD testing was done today.  If she does not receive the results of the Pap within 2 weeks  time, she was instructed to call to find out the results.  I explained to Jud that the recommendations for Pap smear interval in a low risk patient's has lengthened to 3 years time.  I encouraged her to be seen yearly for a full physical exam including breast and pelvic exam even during the off years when PAP's will not be performed.  Reviewed with Jud that [place] now has a 8-year indication  The use of condoms for STD prevention was emphasized.  It was explained to Jud that condoms are not a full proof way to eliminate the chance of a sexually transmitted disease.  Ultimately knowing her partner's sexual history is most important.  All of her questions related to condom use were answered.  Her vaccine record was reviewed and updated.  I discussed with Jud that she may be behind on needed vaccinations for Influenza, TDAP, and COVID.  She may be able to obtain these vaccinations at her local pharmacy OR speak about obtaining them with her primary care.  If she does obtain her vaccines, I have asked Jud to let us know the date each vaccine was obtained so that her medical record could be updated in our system.  The importance of keeping all planned follow-up and taking all medications as prescribed was emphasized.  Follow up for annual exam 1 year           This note was electronically signed.    Jovon Guerra M.D.  October 17, 2023    Part of this note may be an electronic transcription/translation of spoken language to printed text using the Dragon Dictation System.

## 2023-10-18 LAB — REF LAB TEST METHOD: NORMAL

## 2024-02-17 DIAGNOSIS — M25.512 ACUTE PAIN OF LEFT SHOULDER: ICD-10-CM

## 2024-02-19 RX ORDER — IBUPROFEN 600 MG/1
TABLET ORAL
Qty: 30 TABLET | Refills: 0 | OUTPATIENT
Start: 2024-02-19

## 2024-10-18 ENCOUNTER — OFFICE VISIT (OUTPATIENT)
Dept: OBSTETRICS AND GYNECOLOGY | Facility: CLINIC | Age: 34
End: 2024-10-18
Payer: COMMERCIAL

## 2024-10-18 VITALS
DIASTOLIC BLOOD PRESSURE: 80 MMHG | WEIGHT: 293 LBS | RESPIRATION RATE: 14 BRPM | BODY MASS INDEX: 52.42 KG/M2 | SYSTOLIC BLOOD PRESSURE: 126 MMHG

## 2024-10-18 DIAGNOSIS — Z71.85 VACCINE COUNSELING: ICD-10-CM

## 2024-10-18 DIAGNOSIS — Z01.419 WELL WOMAN EXAM: Primary | ICD-10-CM

## 2024-10-18 DIAGNOSIS — N91.2 AMENORRHEA: ICD-10-CM

## 2024-10-18 DIAGNOSIS — Z30.431 CHECKING OF INTRAUTERINE DEVICE: ICD-10-CM

## 2024-10-18 NOTE — PATIENT INSTRUCTIONS
Influenza (Flu) Vaccine (Inactivated or Recombinant): What You Need to Know      1. Why get vaccinated?  Influenza vaccine can prevent influenza (flu).  Flu is a contagious disease that spreads around the [place] every year, usually between October and May. Anyone can get the flu, but it is more dangerous for some people. Infants and young children, people 65 years of age and older, pregnant women, and people with certain health conditions or a weakened immune system are at greatest risk of flu complications.  Pneumonia, bronchitis, sinus infections and ear infections are examples of flu-related complications. If you have a medical condition, such as heart disease, cancer or diabetes, flu can make it worse.  Flu can cause fever and chills, sore throat, muscle aches, fatigue, cough, headache, and runny or stuffy nose. Some people may have vomiting and diarrhea, though this is more common in children than adults.  Each year thousands of people in the [place] die from flu, and many more are hospitalized. Flu vaccine prevents millions of illnesses and flu-related visits to the doctor each year.  2. Influenza vaccine  CDC recommends everyone 6 months of age and older get vaccinated every flu season. Children 6 months through 8 years of age may need 2 doses during a single flu season. Everyone else needs only 1 dose each flu season.  It takes about 2 weeks for protection to develop after vaccination.  There are many flu viruses, and they are always changing. Each year a new flu vaccine is made to protect against three or four viruses that are likely to cause disease in the upcoming flu season. Even when the vaccine doesn't exactly match these viruses, it may still provide some protection.  Influenza vaccine does not cause flu.  Influenza vaccine may be given at the same time as other vaccines.  3. Talk with your health care provider  Tell your vaccine provider if the person getting the vaccine:  Has had an  allergic reaction after a previous dose of influenza vaccine, or has any severe, life-threatening allergies.  Has ever had Guillain-Barré Syndrome (also called GBS).  In some cases, your health care provider may decide to postpone influenza vaccination to a future visit.  People with minor illnesses, such as a cold, may be vaccinated. People who are moderately or severely ill should usually wait until they recover before getting influenza vaccine.  Your health care provider can give you more information.  4. Risks of a vaccine reaction  Soreness, redness, and swelling where shot is given, fever, muscle aches, and headache can happen after influenza vaccine.  There may be a very small increased risk of Guillain-Barré Syndrome (GBS) after inactivated influenza vaccine (the flu shot).  Young children who get the flu shot along with pneumococcal vaccine (PCV13), and/or DTaP vaccine at the same time might be slightly more likely to have a seizure caused by fever. Tell your health care provider if a child who is getting flu vaccine has ever had a seizure.  People sometimes faint after medical procedures, including vaccination. Tell your provider if you feel dizzy or have vision changes or ringing in the ears.  As with any medicine, there is a very remote chance of a vaccine causing a severe allergic reaction, other serious injury, or death.  5. What if there is a serious problem?  An allergic reaction could occur after the vaccinated person leaves the clinic. If you see signs of a severe allergic reaction (hives, swelling of the face and throat, difficulty breathing, a fast heartbeat, dizziness, or weakness), call 9-1-1 and get the person to the nearest hospital.  For other signs that concern you, call your health care provider.  Adverse reactions should be reported to the Vaccine Adverse Event Reporting System (VAERS). Your health care provider will usually file this report, or you can do it yourself. Visit the VAERS  website at www.vaers.[facility].gov or call 1-805.366.6943.VAERS is only for reporting reactions, and VAERS staff do not give medical advice.  6. The National Vaccine Injury Compensation Program  The National Vaccine Injury Compensation Program (VICP) is a federal program that was created to compensate people who may have been injured by certain vaccines. Visit the VICP website at www.[facility].gov/vaccinecompensation or call 1-905.557.4711 to learn about the program and about filing a claim. There is a time limit to file a claim for compensation.  7. How can I learn more?  Ask your healthcare provider.  Call your local or state health department.  Contact the Centers for Disease Control and Prevention (CDC):  Call 1-682.386.3776 (4-545-PNG-INFO) or  Visit CDC's www.cdc.gov/flu    Vaccine Information Statement (Interim) Inactivated Influenza Vaccine (8/15/2019)  This information is not intended to replace advice given to you by your health care provider. Make sure you discuss any questions you have with your health care provider.  Document Released: 10/12/2007 Document Revised: 2020 Document Reviewed: 2019  Elsevier Patient Education ©  [employer]            Tdap Vaccine (Tetanus, Diphtheria and Pertussis): What You Need to Know      1. Why get vaccinated?  Tetanus, diphtheria and pertussis are very serious diseases. Tdap vaccine can protect us from these diseases. And, Tdap vaccine given to pregnant women can protect  babies against pertussis..  TETANUS (Lockjaw) is rare in the [place] today. It causes painful muscle tightening and stiffness, usually all over the body.  It can lead to tightening of muscles in the head and neck so you can't open your mouth, swallow, or sometimes even breathe. Tetanus kills about 1 out of 10 people who are infected even after receiving the best medical care.  DIPHTHERIA is also rare in the [place] today. It can cause a thick coating to form in the back of  the throat.  It can lead to breathing problems, heart failure, paralysis, and death.  PERTUSSIS (Whooping Cough) causes severe coughing spells, which can cause difficulty breathing, vomiting and disturbed sleep.  It can also lead to weight loss, incontinence, and rib fractures. Up to 2 in 100 adolescents and 5 in 100 adults with pertussis are hospitalized or have complications, which could include pneumonia or death.    These diseases are caused by bacteria. Diphtheria and pertussis are spread from person to person through secretions from coughing or sneezing. Tetanus enters the body through cuts, scratches, or wounds.  Before vaccines, as many as 200,000 cases of diphtheria, 200,000 cases of pertussis, and hundreds of cases of tetanus, were reported in the [place] each year. Since vaccination began, reports of cases for tetanus and diphtheria have dropped by about 99% and for pertussis by about 80%.    2. Tdap vaccine  Tdap vaccine can protect adolescents and adults from tetanus, diphtheria, and pertussis. One dose of Tdap is routinely given at age 11 or 12. People who did not get Tdap at that age should get it as soon as possible.  Tdap is especially important for healthcare professionals and anyone having close contact with a baby younger than 12 months.  Pregnant women should get a dose of Tdap during every pregnancy, to protect the  from pertussis. Infants are most at risk for severe, life-threatening complications from pertussis.  Another vaccine, called Td, protects against tetanus and diphtheria, but not pertussis. A Td booster should be given every 10 years. Tdap may be given as one of these boosters if you have never gotten Tdap before. Tdap may also be given after a severe cut or burn to prevent tetanus infection.  Your doctor or the person giving you the vaccine can give you more information.  Tdap may safely be given at the same time as other vaccines.    3. Some people should not get this  vaccine  A person who has ever had a life-threatening allergic reaction after a previous dose of any diphtheria, tetanus or pertussis containing vaccine, OR has a severe allergy to any part of this vaccine, should not get Tdap vaccine. Tell the person giving the vaccine about any severe allergies.  Anyone who had coma or long repeated seizures within 7 days after a childhood dose of DTP or DTaP, or a previous dose of Tdap, should not get Tdap, unless a cause other than the vaccine was found. They can still get Td.  Talk to your doctor if you:  have seizures or another nervous system problem,  had severe pain or swelling after any vaccine containing diphtheria, tetanus or pertussis,  ever had a condition called Guillain-Barré Syndrome (GBS),  aren't feeling well on the day the shot is scheduled.    4. Risks  With any medicine, including vaccines, there is a chance of side effects. These are usually mild and go away on their own. Serious reactions are also possible but are rare.  Most people who get Tdap vaccine do not have any problems with it.  Mild problems following Tdap  (Did not interfere with activities)  Pain where the shot was given (about 3 in 4 adolescents or 2 in 3 adults)  Redness or swelling where the shot was given (about 1 person in 5)  Mild fever of at least 100.4°F (up to about 1 in 25 adolescents or 1 in 100 adults)  Headache (about 3 or 4 people in 10)  Tiredness (about 1 person in 3 or 4)  Nausea, vomiting, diarrhea, stomach ache (up to 1 in 4 adolescents or 1 in 10 adults)  Chills, sore joints (about 1 person in 10)  Body aches (about 1 person in 3 or 4)  Rash, swollen glands (uncommon)  Moderate problems following Tdap  (Interfered with activities, but did not require medical attention)  Pain where the shot was given (up to 1 in 5 or 6)  Redness or swelling where the shot was given (up to about 1 in 16 adolescents or 1 in 12 adults)  Fever over 102°F (about 1 in 100 adolescents or 1 in 250  adults)  Headache (about 1 in 7 adolescents or 1 in 10 adults)  Nausea, vomiting, diarrhea, stomach ache (up to 1 or 3 people in 100)  Swelling of the entire arm where the shot was given (up to about 1 in 500).  Severe problems following Tdap  (Unable to perform usual activities; required medical attention)  Swelling, severe pain, bleeding and redness in the arm where the shot was given (rare).  Problems that could happen after any vaccine:  People sometimes faint after a medical procedure, including vaccination. Sitting or lying down for about 15 minutes can help prevent fainting, and injuries caused by a fall. Tell your doctor if you feel dizzy, or have vision changes or ringing in the ears.  Some people get severe pain in the shoulder and have difficulty moving the arm where a shot was given. This happens very rarely.  Any medication can cause a severe allergic reaction. Such reactions from a vaccine are very rare, estimated at fewer than 1 in a million doses, and would happen within a few minutes to a few hours after the vaccination.  As with any medicine, there is a very remote chance of a vaccine causing a serious injury or death.  The safety of vaccines is always being monitored. For more information, visit: www.cdc.gov/vaccinesafety/    5. What if there is a serious problem?  What should I look for?  Look for anything that concerns you, such as signs of a severe allergic reaction, very high fever, or unusual behavior.  Signs of a severe allergic reaction can include hives, swelling of the face and throat, difficulty breathing, a fast heartbeat, dizziness, and weakness. These would usually start a few minutes to a few hours after the vaccination.  What should I do?  If you think it is a severe allergic reaction or other emergency that can't wait, call 9-1-1 or get the person to the nearest hospital. Otherwise, call your doctor.  Afterward, the reaction should be reported to the Vaccine Adverse Event Reporting  System ([facility]). Your doctor might file this report, or you can do it yourself through the [employer] web site at www.vaers.[facility].gov, or by calling 1-592.313.7065.  [employer] does not give medical advice.    6. The National Vaccine Injury Compensation Program  The National Vaccine Injury Compensation Program (VICP) is a federal program that was created to compensate people who may have been injured by certain vaccines.  Persons who believe they may have been injured by a vaccine can learn about the program and about filing a claim by calling 1-354.231.4931 or visiting the VICP website at www.[facility].gov/vaccinecompensation. There is a time limit to file a claim for compensation.    7. How can I learn more?  Ask your doctor. He or she can give you the vaccine package insert or suggest other sources of information.  Call your local or state health department.  Contact the Centers for Disease Control and Prevention (CDC):  Call 1-541.354.6027 (9-640-SQC-INFO) or  Visit CDC's website at www.cdc.gov/vaccines      Vaccine Information Statement Tdap Vaccine (2/24/2015)  This information is not intended to replace advice given to you by your health care provider. Make sure you discuss any questions you have with your health care provider.  Document Released: 06/18/2013 Document Revised: 08/05/2019 Document Reviewed: 08/05/2019  Elsevier Interactive Patient Education © 2019 Elsevier Inc.

## 2024-10-18 NOTE — PROGRESS NOTES
Subjective   Chief Complaint   Patient presents with    Gynecologic Exam     Jud Cerrato is a 33 y.o. year old  presenting to be seen for her annual exam.     SEXUAL Hx:  She is currently sexually active.  In the past year there there has been NO new sexual partners.    Condoms are never used.  She would not like to be screened for STD's at today's exam.  Current birth control method: IUD - Mirena.  She is happy with her current method of contraception and does not want to discuss alternative methods of contraception.  MENSTRUAL Hx:  No LMP recorded. Patient has had an implant.  In the past 6 months her cycles have been absent.  Her menstrual flow has been absent.   Each month on average there are roughly 0 day(s) of very heavy flow.  Intermenstrual bleeding is absent.    Post-coital bleeding is absent.  Dysmenorrhea: is not affecting her activities of daily living  PMS: is not affecting her activities of daily living  Her cycles are not a source of concern for her that she wishes to discuss today.  HEALTH Hx:  She exercises regularly: no (and has no plans to become more active).  She wears her seat belt: yes.  She has concerns about domestic violence: no.    The following portions of the patient's history were reviewed and updated as appropriate:problem list, current medications, allergies, past family history, past medical history, past social history, and past surgical history.    Social History    Tobacco Use      Smoking status: Former        Packs/day: 0.00        Types: Cigarettes        Start date: 2012        Quit date: 3/1/2017        Years since quittin.6      Smokeless tobacco: Never    Review of Systems  Constitutional POS: nothing reported    NEG: anorexia or night sweats   Genitourinary POS: nothing reported    NEG: dysuria or hematuria      Gastointestinal POS: constipation (chronic)    NEG: bloating, change in bowel habits, melena, or reflux symptoms   Integument POS:  nothing reported    NEG: moles that are changing in size, shape, color or rashes   Breast POS: nothing reported    NEG: persistent breast lump, skin dimpling, or nipple discharge        Objective   /80   Resp 14   Wt (!) 143 kg (315 lb)   BMI 52.42 kg/m²     General:  well developed; well nourished  no acute distress  mentation appropriate   Skin:  No suspicious lesions seen   Thyroid: normal to inspection and palpation   Breasts:  Examined in supine position  Symmetric without masses or skin dimpling  Nipples normal without inversion, lesions or discharge  There are no palpable axillary nodes   Abdomen: soft, non-tender; no masses  no umbilical or inguinal hernias are present  no hepato-splenomegaly   Pelvis: Exam limited by  body habitus  Clinical staff was present for exam  External genitalia:  normal appearance of the external genitalia including Bartholin's and Crossgate's glands.  :  urethral meatus normal;  Vaginal:  normal pink mucosa without prolapse or lesions.  Cervix:  normal appearance. IUD string not seen;  Uterus:  normal size, shape and consistency.  Adnexa:  non palpable bilaterally.  Rectal:  digital rectal exam not performed; anus visually normal appearing.        Assessment   Normal GYN exam limited by weight  Amenorrhea - most consistent with hormonal IUD use  Mirena (placed 9/2020)  Chronic constipation - not significantly affecting her activities of daily living.     Plan   Pap was not done today.  I explained to Jud that the recommendations for Pap smear interval in a low risk patient has lengthened to 3 years time.  I told Jud she still needs to be seen in our office yearly for a full physical including breast and pelvic exam.  I have counseled the patient on the importance of staying up to date with preventive vaccines as well as the risks and benefits of these vaccines.  Her vaccine record was reviewed and updated.  I discussed with Jud that she may be behind on needed  vaccinations for Influenza and TDAP.  She may be able to obtain these vaccinations at her local pharmacy OR speak about obtaining them with her primary care.  If she does obtain her vaccines, I have asked Jud to let us know the date each vaccine was obtained so that her medical record could be updated in our system.  The importance of keeping all planned follow-up and taking all medications as prescribed was emphasized.  Follow up for annual exam 1 year         This note was electronically signed.    Jovon Guerra M.D.  October 18, 2024    Part of this note may be an electronic transcription/translation of spoken language to printed text using the Dragon Dictation System.